# Patient Record
Sex: FEMALE | Race: OTHER | Employment: FULL TIME | URBAN - METROPOLITAN AREA
[De-identification: names, ages, dates, MRNs, and addresses within clinical notes are randomized per-mention and may not be internally consistent; named-entity substitution may affect disease eponyms.]

---

## 2023-06-05 ENCOUNTER — OFFICE VISIT (OUTPATIENT)
Dept: PRIMARY CARE CLINIC | Age: 38
End: 2023-06-05
Payer: COMMERCIAL

## 2023-06-05 VITALS
HEART RATE: 79 BPM | TEMPERATURE: 98.4 F | DIASTOLIC BLOOD PRESSURE: 78 MMHG | WEIGHT: 260 LBS | OXYGEN SATURATION: 99 % | SYSTOLIC BLOOD PRESSURE: 124 MMHG

## 2023-06-05 DIAGNOSIS — Z11.4 SCREENING FOR HIV WITHOUT PRESENCE OF RISK FACTORS: ICD-10-CM

## 2023-06-05 DIAGNOSIS — L30.9 ECZEMA, UNSPECIFIED TYPE: ICD-10-CM

## 2023-06-05 DIAGNOSIS — Z00.00 ROUTINE ADULT HEALTH MAINTENANCE: ICD-10-CM

## 2023-06-05 DIAGNOSIS — Z00.00 ROUTINE ADULT HEALTH MAINTENANCE: Primary | ICD-10-CM

## 2023-06-05 DIAGNOSIS — J30.2 SEASONAL ALLERGIES: ICD-10-CM

## 2023-06-05 DIAGNOSIS — Z11.59 NEED FOR HEPATITIS C SCREENING TEST: ICD-10-CM

## 2023-06-05 LAB
ALBUMIN SERPL-MCNC: 4 G/DL (ref 3.5–4.6)
ALP SERPL-CCNC: 55 U/L (ref 40–130)
ALT SERPL-CCNC: 24 U/L (ref 0–33)
ANION GAP SERPL CALCULATED.3IONS-SCNC: 11 MEQ/L (ref 9–15)
AST SERPL-CCNC: 17 U/L (ref 0–35)
BASOPHILS # BLD: 0 K/UL (ref 0–0.2)
BASOPHILS NFR BLD: 0.4 %
BILIRUB SERPL-MCNC: 0.4 MG/DL (ref 0.2–0.7)
BUN SERPL-MCNC: 11 MG/DL (ref 6–20)
CALCIUM SERPL-MCNC: 9 MG/DL (ref 8.5–9.9)
CHLORIDE SERPL-SCNC: 104 MEQ/L (ref 95–107)
CHOLEST SERPL-MCNC: 116 MG/DL (ref 0–199)
CO2 SERPL-SCNC: 25 MEQ/L (ref 20–31)
CREAT SERPL-MCNC: 0.61 MG/DL (ref 0.5–0.9)
EOSINOPHIL # BLD: 0.1 K/UL (ref 0–0.7)
EOSINOPHIL NFR BLD: 1.5 %
ERYTHROCYTE [DISTWIDTH] IN BLOOD BY AUTOMATED COUNT: 13.6 % (ref 11.5–14.5)
GLOBULIN SER CALC-MCNC: 4.2 G/DL (ref 2.3–3.5)
GLUCOSE SERPL-MCNC: 89 MG/DL (ref 70–99)
HBA1C MFR BLD: 5 % (ref 4.8–5.9)
HCT VFR BLD AUTO: 42.2 % (ref 37–47)
HDLC SERPL-MCNC: 46 MG/DL (ref 40–59)
HEPATITIS C ANTIBODY: NONREACTIVE
HGB BLD-MCNC: 14.2 G/DL (ref 12–16)
HIV AG/AB: NONREACTIVE
LDL CHOLESTEROL CALCULATED: 61 MG/DL (ref 0–129)
LYMPHOCYTES # BLD: 1.9 K/UL (ref 1–4.8)
LYMPHOCYTES NFR BLD: 19.4 %
MCH RBC QN AUTO: 31.4 PG (ref 27–31.3)
MCHC RBC AUTO-ENTMCNC: 33.7 % (ref 33–37)
MCV RBC AUTO: 93.2 FL (ref 79.4–94.8)
MONOCYTES # BLD: 0.7 K/UL (ref 0.2–0.8)
MONOCYTES NFR BLD: 7.8 %
NEUTROPHILS # BLD: 6.8 K/UL (ref 1.4–6.5)
NEUTS SEG NFR BLD: 70.9 %
PLATELET # BLD AUTO: 298 K/UL (ref 130–400)
POTASSIUM SERPL-SCNC: 4.1 MEQ/L (ref 3.4–4.9)
PROT SERPL-MCNC: 8.2 G/DL (ref 6.3–8)
RBC # BLD AUTO: 4.53 M/UL (ref 4.2–5.4)
SODIUM SERPL-SCNC: 140 MEQ/L (ref 135–144)
TRIGLYCERIDE, FASTING: 43 MG/DL (ref 0–150)
TSH REFLEX: 1.15 UIU/ML (ref 0.44–3.86)
VITAMIN D 25-HYDROXY: 28.5 NG/ML
WBC # BLD AUTO: 9.6 K/UL (ref 4.8–10.8)

## 2023-06-05 PROCEDURE — 99385 PREV VISIT NEW AGE 18-39: CPT | Performed by: STUDENT IN AN ORGANIZED HEALTH CARE EDUCATION/TRAINING PROGRAM

## 2023-06-05 RX ORDER — BENZONATATE 100 MG/1
100 CAPSULE ORAL 3 TIMES DAILY PRN
COMMUNITY
Start: 2021-09-20 | End: 2023-06-05 | Stop reason: ALTCHOICE

## 2023-06-05 RX ORDER — ALBUTEROL SULFATE 90 UG/1
2 AEROSOL, METERED RESPIRATORY (INHALATION) EVERY 4 HOURS PRN
COMMUNITY
Start: 2021-06-28 | End: 2023-06-05 | Stop reason: ALTCHOICE

## 2023-06-05 RX ORDER — MONTELUKAST SODIUM 10 MG/1
TABLET ORAL
COMMUNITY
Start: 2021-06-28 | End: 2023-06-05 | Stop reason: SDUPTHER

## 2023-06-05 RX ORDER — BUPROPION HYDROCHLORIDE 150 MG/1
150 TABLET, EXTENDED RELEASE ORAL 2 TIMES DAILY
COMMUNITY
Start: 2021-09-13 | End: 2023-06-05 | Stop reason: ALTCHOICE

## 2023-06-05 RX ORDER — ESCITALOPRAM OXALATE 10 MG/1
10 TABLET ORAL DAILY
COMMUNITY
End: 2023-06-05 | Stop reason: ALTCHOICE

## 2023-06-05 RX ORDER — CETIRIZINE HYDROCHLORIDE 10 MG/1
10 TABLET ORAL NIGHTLY
COMMUNITY
Start: 2021-06-28

## 2023-06-05 RX ORDER — FLUTICASONE PROPIONATE 44 UG/1
2 AEROSOL, METERED RESPIRATORY (INHALATION) 2 TIMES DAILY
COMMUNITY
Start: 2021-09-27 | End: 2023-06-05 | Stop reason: ALTCHOICE

## 2023-06-05 RX ORDER — METHYLPREDNISOLONE 4 MG/1
TABLET ORAL
COMMUNITY
Start: 2021-09-20 | End: 2023-06-05 | Stop reason: ALTCHOICE

## 2023-06-05 RX ORDER — MONTELUKAST SODIUM 10 MG/1
TABLET ORAL
Qty: 30 TABLET | Refills: 5 | Status: SHIPPED | OUTPATIENT
Start: 2023-06-05

## 2023-06-05 RX ORDER — UREA 10 %
1 LOTION (ML) TOPICAL DAILY
COMMUNITY
Start: 2020-08-21 | End: 2023-06-05 | Stop reason: ALTCHOICE

## 2023-06-05 RX ORDER — NORETHINDRONE ACETATE AND ETHINYL ESTRADIOL 1; 5 MG/1; UG/1
1 TABLET ORAL DAILY
COMMUNITY

## 2023-06-05 RX ORDER — GUAIFENESIN 600 MG/1
600 TABLET, EXTENDED RELEASE ORAL EVERY 12 HOURS PRN
COMMUNITY
Start: 2021-09-24 | End: 2023-06-05 | Stop reason: ALTCHOICE

## 2023-06-05 SDOH — ECONOMIC STABILITY: INCOME INSECURITY: HOW HARD IS IT FOR YOU TO PAY FOR THE VERY BASICS LIKE FOOD, HOUSING, MEDICAL CARE, AND HEATING?: NOT VERY HARD

## 2023-06-05 SDOH — ECONOMIC STABILITY: HOUSING INSECURITY
IN THE LAST 12 MONTHS, WAS THERE A TIME WHEN YOU DID NOT HAVE A STEADY PLACE TO SLEEP OR SLEPT IN A SHELTER (INCLUDING NOW)?: NO

## 2023-06-05 SDOH — ECONOMIC STABILITY: FOOD INSECURITY: WITHIN THE PAST 12 MONTHS, THE FOOD YOU BOUGHT JUST DIDN'T LAST AND YOU DIDN'T HAVE MONEY TO GET MORE.: NEVER TRUE

## 2023-06-05 SDOH — ECONOMIC STABILITY: FOOD INSECURITY: WITHIN THE PAST 12 MONTHS, YOU WORRIED THAT YOUR FOOD WOULD RUN OUT BEFORE YOU GOT MONEY TO BUY MORE.: NEVER TRUE

## 2023-06-05 ASSESSMENT — PATIENT HEALTH QUESTIONNAIRE - PHQ9
SUM OF ALL RESPONSES TO PHQ QUESTIONS 1-9: 0
2. FEELING DOWN, DEPRESSED OR HOPELESS: 0
SUM OF ALL RESPONSES TO PHQ QUESTIONS 1-9: 0
1. LITTLE INTEREST OR PLEASURE IN DOING THINGS: 0
SUM OF ALL RESPONSES TO PHQ9 QUESTIONS 1 & 2: 0

## 2023-06-05 NOTE — PATIENT INSTRUCTIONS
Have labwork done, fast for 10 hours beforehand, you may have black coffee or water only. Our clinic will contact you when results are made available. OB/GYN referral placed, they will call you to set this. Dermatology referral placed, they will call you to schedule this. Refilled Singulair  Continue Zyrtec  Continue seeing therapy and let me know if you would like to restart Wellbutrin.

## 2023-06-05 NOTE — PROGRESS NOTES
MG tablet Therapy completed    benzonatate (TESSALON) 100 MG capsule Therapy completed    methylPREDNISolone (MEDROL DOSEPACK) 4 MG tablet Therapy completed    albuterol sulfate HFA (PROVENTIL;VENTOLIN;PROAIR) 108 (90 Base) MCG/ACT inhaler Therapy completed    fluticasone (FLOVENT HFA) 44 MCG/ACT inhaler Therapy completed    guaiFENesin (MUCINEX) 600 MG extended release tablet Therapy completed    Lactobacillus TABS Therapy completed    buPROPion (WELLBUTRIN SR) 150 MG extended release tablet Therapy completed    montelukast (SINGULAIR) 10 MG tablet REORDER     Return in about 6 months (around 12/5/2023). Advised to use eczema lotion until she can get into the dermatologist.  Referral to OB/GYN for Pap smear, birth control refill, PCOS testing. Routine lab work. She will continue to see therapist and let me know if she wants to restart Wellbutrin. Objective  Allergies   Allergen Reactions    Zolmitriptan Shortness Of Breath     Current Outpatient Medications   Medication Sig Dispense Refill    norethindrone-ethinyl estradiol (JINTELI, FYAVOLV) 1-5 MG-MCG TABS per tablet Take 1 tablet by mouth daily      montelukast (SINGULAIR) 10 MG tablet take 1 tablet by mouth nightly at bedtime. Can add to cetirizine if needed 30 tablet 5    cetirizine (ZYRTEC) 10 MG tablet Take 1 tablet by mouth nightly (Patient not taking: Reported on 6/5/2023)       No current facility-administered medications for this visit. PMH, Surgical Hx, Family Hx, and Social Hx reviewed and updated. Health Maintenance reviewed.     Vitals:    06/05/23 0917   BP: 124/78   Site: Right Upper Arm   Position: Sitting   Cuff Size: Large Adult   Pulse: 79   Temp: 98.4 °F (36.9 °C)   TempSrc: Temporal   SpO2: 99%   Weight: 260 lb (117.9 kg)     BP Readings from Last 3 Encounters:   06/05/23 124/78     Wt Readings from Last 3 Encounters:   06/05/23 260 lb (117.9 kg)       No results found for: LABA1C  No results found for: LABMICR, CREATININE  No

## 2023-06-15 DIAGNOSIS — N92.6 IRREGULAR PERIODS/MENSTRUAL CYCLES: ICD-10-CM

## 2023-06-15 DIAGNOSIS — L68.0 HIRSUTISM: ICD-10-CM

## 2023-06-15 DIAGNOSIS — Z11.51 SCREENING FOR HUMAN PAPILLOMAVIRUS: ICD-10-CM

## 2023-06-15 DIAGNOSIS — Z01.419 ENCOUNTER FOR WELL WOMAN EXAM WITH ROUTINE GYNECOLOGICAL EXAM: ICD-10-CM

## 2023-06-15 DIAGNOSIS — L70.8 OTHER ACNE: ICD-10-CM

## 2023-06-15 LAB — PROLACTIN SERPL-MCNC: 27.6 NG/ML

## 2023-06-16 LAB
DHEA-S SERPL-MCNC: 188 UG/DL (ref 45–270)
FOLLICLE STIMULATING HORMONE: 9.4 MIU/ML (ref 1.7–21.5)
LH: 10.9 MIU/ML (ref 1–95.6)
SHBG SERPL-SCNC: 40 NMOL/L (ref 30–135)
TESTOST FREE SERPL-MCNC: 7.4 PG/ML (ref 1.3–9.2)
TESTOST SERPL-MCNC: 46 NG/DL (ref 20–70)

## 2023-06-18 LAB
INSULIN FREE SERPL-ACNC: 12 UIU/ML (ref 3–25)
INSULIN SERPL-ACNC: 15 UIU/ML (ref 3–25)

## 2023-06-19 LAB — MIS SERPL-MCNC: 0.16 NG/ML (ref 0.18–11.71)

## 2023-06-21 ENCOUNTER — HOSPITAL ENCOUNTER (OUTPATIENT)
Dept: ULTRASOUND IMAGING | Age: 38
Discharge: HOME | End: 2023-06-23
Payer: COMMERCIAL

## 2023-06-21 DIAGNOSIS — N92.6 IRREGULAR PERIODS/MENSTRUAL CYCLES: ICD-10-CM

## 2023-06-21 LAB — 17OHP SERPL-MCNC: 13.98 NG/DL

## 2023-06-21 PROCEDURE — 76856 US EXAM PELVIC COMPLETE: CPT

## 2023-06-21 PROCEDURE — 93975 VASCULAR STUDY: CPT

## 2023-06-21 PROCEDURE — 76830 TRANSVAGINAL US NON-OB: CPT

## 2023-06-24 LAB
HPV HR 12 DNA SPEC QL NAA+PROBE: NOT DETECTED
HPV16 DNA SPEC QL NAA+PROBE: NOT DETECTED
HPV16+18+H RISK 12 DNA SPEC-IMP: NORMAL
HPV18 DNA SPEC QL NAA+PROBE: NOT DETECTED

## 2023-07-05 ENCOUNTER — OFFICE VISIT (OUTPATIENT)
Dept: OBGYN CLINIC | Age: 38
End: 2023-07-05
Payer: COMMERCIAL

## 2023-07-05 VITALS
DIASTOLIC BLOOD PRESSURE: 72 MMHG | HEIGHT: 64 IN | SYSTOLIC BLOOD PRESSURE: 126 MMHG | WEIGHT: 258 LBS | BODY MASS INDEX: 44.05 KG/M2

## 2023-07-05 DIAGNOSIS — N92.6 IRREGULAR PERIODS/MENSTRUAL CYCLES: Primary | ICD-10-CM

## 2023-07-05 PROCEDURE — 99213 OFFICE O/P EST LOW 20 MIN: CPT | Performed by: OBSTETRICS & GYNECOLOGY

## 2023-07-05 RX ORDER — ACETAMINOPHEN AND CODEINE PHOSPHATE 120; 12 MG/5ML; MG/5ML
1 SOLUTION ORAL DAILY
Qty: 3 TABLET | Refills: 3 | Status: SHIPPED | OUTPATIENT
Start: 2023-07-05

## 2023-07-05 ASSESSMENT — ENCOUNTER SYMPTOMS
ABDOMINAL DISTENTION: 0
RESPIRATORY NEGATIVE: 1
VOMITING: 0
ALLERGIC/IMMUNOLOGIC NEGATIVE: 1
DIARRHEA: 0
NAUSEA: 0
BLOOD IN STOOL: 0
ABDOMINAL PAIN: 0
CONSTIPATION: 0
EYES NEGATIVE: 1
ANAL BLEEDING: 0
RECTAL PAIN: 0

## 2023-07-21 ENCOUNTER — OFFICE VISIT (OUTPATIENT)
Dept: OBGYN CLINIC | Age: 38
End: 2023-07-21
Payer: COMMERCIAL

## 2023-07-21 VITALS
HEART RATE: 89 BPM | SYSTOLIC BLOOD PRESSURE: 118 MMHG | DIASTOLIC BLOOD PRESSURE: 74 MMHG | WEIGHT: 259 LBS | BODY MASS INDEX: 44.46 KG/M2

## 2023-07-21 DIAGNOSIS — N76.4 VULVAR ABSCESS: Primary | ICD-10-CM

## 2023-07-21 PROCEDURE — 99212 OFFICE O/P EST SF 10 MIN: CPT | Performed by: ADVANCED PRACTICE MIDWIFE

## 2023-07-21 RX ORDER — CLOBETASOL PROPIONATE 0.5 MG/G
OINTMENT TOPICAL
COMMUNITY
Start: 2023-07-14

## 2023-07-21 RX ORDER — MOMETASONE FUROATE 1 MG/G
CREAM TOPICAL
COMMUNITY
Start: 2023-07-14

## 2023-07-21 RX ORDER — BENZOYL PEROXIDE 50 MG/ML
LIQUID TOPICAL
COMMUNITY
Start: 2023-07-15

## 2023-07-21 RX ORDER — CLINDAMYCIN PHOSPHATE 10 UG/ML
LOTION TOPICAL
COMMUNITY
Start: 2023-07-14

## 2023-07-21 RX ORDER — KETOCONAZOLE 20 MG/G
CREAM TOPICAL
COMMUNITY
Start: 2023-07-14

## 2023-07-21 RX ORDER — AMMONIUM LACTATE 12 G/100G
CREAM TOPICAL
COMMUNITY
Start: 2023-07-14

## 2023-07-21 ASSESSMENT — ENCOUNTER SYMPTOMS
VOMITING: 0
CONSTIPATION: 0
SHORTNESS OF BREATH: 0
DIARRHEA: 0
NAUSEA: 0
COUGH: 0
ABDOMINAL PAIN: 0

## 2023-09-01 ENCOUNTER — OFFICE VISIT (OUTPATIENT)
Dept: PRIMARY CARE CLINIC | Age: 38
End: 2023-09-01
Payer: COMMERCIAL

## 2023-09-01 VITALS
TEMPERATURE: 97.5 F | HEIGHT: 64 IN | WEIGHT: 261 LBS | SYSTOLIC BLOOD PRESSURE: 128 MMHG | HEART RATE: 90 BPM | BODY MASS INDEX: 44.56 KG/M2 | DIASTOLIC BLOOD PRESSURE: 86 MMHG | OXYGEN SATURATION: 98 %

## 2023-09-01 DIAGNOSIS — M54.16 LUMBAR BACK PAIN WITH RADICULOPATHY AFFECTING RIGHT LOWER EXTREMITY: Primary | ICD-10-CM

## 2023-09-01 PROCEDURE — 99213 OFFICE O/P EST LOW 20 MIN: CPT | Performed by: STUDENT IN AN ORGANIZED HEALTH CARE EDUCATION/TRAINING PROGRAM

## 2023-09-01 RX ORDER — METHYLPREDNISOLONE 4 MG/1
TABLET ORAL
Qty: 1 KIT | Refills: 0 | Status: SHIPPED | OUTPATIENT
Start: 2023-09-01

## 2023-09-01 NOTE — PATIENT INSTRUCTIONS
Steroid pack  Low back x-ray  if no improvement in 5 days, you can come in for a Toradol shot.   Advised to take vitamin C 1000 mg daily for 5 days to boost immune system

## 2023-09-01 NOTE — PROGRESS NOTES
9/1/2023        Kylie Borden 1985 is a 41 y.o. female who presents today with:  Chief Complaint   Patient presents with    Lower Back Pain     Patient presents today with complaint of lower back pain x one day, patient states the pain is also on her back radiating down her right leg        HPI:   Pepper Vallecillo presents today due to right lower back pain radiating to her right leg for 1 day. She states the pain feels like pressure in her lower back and right hip. She denies any significant history of back injuries. She states sleep most pain, through the day does not remember spraining herself too much or injuring herself. She denies difficulty with bowel movements or urinary problems. She states that all movements make the pain worse. Assessment & Plan   1. Lumbar back pain with radiculopathy affecting right lower extremity  -     methylPREDNISolone (MEDROL DOSEPACK) 4 MG tablet; Take by mouth as directed on package once daily, Disp-1 kit, R-0Normal  -     XR LUMBAR SPINE (2-3 VIEWS); Future     There are no discontinued medications. No follow-ups on file. Steroid pack, if no improvement in 5 days, advised she can come in for a Toradol shot. Advised take vitamin C 1000 mg daily for 5 days to boost immune system    Objective  Allergies   Allergen Reactions    Zolmitriptan Shortness Of Breath     Current Outpatient Medications   Medication Sig Dispense Refill    ammonium lactate (AMLACTIN) 12 % cream APPLY A THIN LAYER TO FEET TWICE DAILY      BENZOYL PEROXIDE 5 % external wash WASH BACK ONCE DAILY IN THE SHOWER.       clindamycin (CLEOCIN T) 1 % lotion apply a thin layer to back once a day after shower and drying off.      clobetasol (TEMOVATE) 0.05 % ointment apply a thin layer to feet twice daily for 3 weeks one week off then repeat until clear.      ketoconazole (NIZORAL) 2 % cream MIX A PEA SIZED AMOUNT WITH A PEA SIZED AMOUNT OF MOMETASONE CREAM. APPLY A THIN AMOUNT TO AFFECTED AREAS ON FACE TWICE

## 2023-09-13 DIAGNOSIS — M54.16 LUMBAR BACK PAIN WITH RADICULOPATHY AFFECTING RIGHT LOWER EXTREMITY: Primary | ICD-10-CM

## 2023-09-27 ENCOUNTER — OFFICE VISIT (OUTPATIENT)
Dept: PRIMARY CARE CLINIC | Age: 38
End: 2023-09-27
Payer: COMMERCIAL

## 2023-09-27 VITALS
SYSTOLIC BLOOD PRESSURE: 114 MMHG | OXYGEN SATURATION: 99 % | WEIGHT: 263.8 LBS | BODY MASS INDEX: 45.04 KG/M2 | DIASTOLIC BLOOD PRESSURE: 76 MMHG | HEIGHT: 64 IN | HEART RATE: 79 BPM

## 2023-09-27 DIAGNOSIS — M54.16 LUMBAR RADICULOPATHY: ICD-10-CM

## 2023-09-27 DIAGNOSIS — E66.01 CLASS 3 SEVERE OBESITY WITH BODY MASS INDEX (BMI) OF 45.0 TO 49.9 IN ADULT, UNSPECIFIED OBESITY TYPE, UNSPECIFIED WHETHER SERIOUS COMORBIDITY PRESENT (HCC): Primary | ICD-10-CM

## 2023-09-27 PROCEDURE — 99213 OFFICE O/P EST LOW 20 MIN: CPT | Performed by: STUDENT IN AN ORGANIZED HEALTH CARE EDUCATION/TRAINING PROGRAM

## 2023-09-27 NOTE — PROGRESS NOTES
montelukast (SINGULAIR) 10 MG tablet take 1 tablet by mouth nightly at bedtime. Can add to cetirizine if needed 30 tablet 5     No current facility-administered medications for this visit. PMH, Surgical Hx, Family Hx, and Social Hx reviewed and updated. Health Maintenance reviewed. Vitals:    09/27/23 1401   BP: 114/76   Site: Left Upper Arm   Position: Sitting   Cuff Size: Large Adult   Pulse: 79   SpO2: 99%   Weight: 263 lb 12.8 oz (119.7 kg)   Height: 5' 4\" (1.626 m)     BP Readings from Last 3 Encounters:   09/27/23 114/76   09/21/23 130/78   09/01/23 128/86     Wt Readings from Last 3 Encounters:   09/27/23 263 lb 12.8 oz (119.7 kg)   09/21/23 260 lb (117.9 kg)   09/01/23 261 lb (118.4 kg)       Lab Results   Component Value Date    LABA1C 5.0 06/05/2023     Lab Results   Component Value Date    CREATININE 0.61 06/05/2023     Lab Results   Component Value Date    ALT 24 06/05/2023    AST 17 06/05/2023     Lab Results   Component Value Date    HDL 46 06/05/2023    LDLCALC 61 06/05/2023        Review of Systems   Physical Exam  Constitutional:       General: She is not in acute distress. Appearance: Normal appearance. She is obese. She is not toxic-appearing. HENT:      Right Ear: External ear normal.      Left Ear: External ear normal.      Mouth/Throat:      Mouth: Mucous membranes are moist.   Eyes:      Extraocular Movements: Extraocular movements intact. Pupils: Pupils are equal, round, and reactive to light. Neck:      Vascular: No carotid bruit. Cardiovascular:      Rate and Rhythm: Normal rate and regular rhythm. Pulses: Normal pulses. Heart sounds: Normal heart sounds. Pulmonary:      Effort: No respiratory distress. Breath sounds: No wheezing. Abdominal:      General: Bowel sounds are normal.      Palpations: Abdomen is soft. Tenderness: There is no abdominal tenderness. Musculoskeletal:         General: Normal range of motion.    Skin:     General:

## 2023-10-12 ENCOUNTER — OFFICE VISIT (OUTPATIENT)
Dept: FAMILY MEDICINE CLINIC | Age: 38
End: 2023-10-12
Payer: COMMERCIAL

## 2023-10-12 VITALS
OXYGEN SATURATION: 99 % | BODY MASS INDEX: 45.58 KG/M2 | WEIGHT: 267 LBS | SYSTOLIC BLOOD PRESSURE: 118 MMHG | HEIGHT: 64 IN | HEART RATE: 76 BPM | DIASTOLIC BLOOD PRESSURE: 76 MMHG

## 2023-10-12 DIAGNOSIS — N92.6 IRREGULAR MENSTRUAL CYCLE: Primary | ICD-10-CM

## 2023-10-12 DIAGNOSIS — R63.5 ABNORMAL WEIGHT GAIN: ICD-10-CM

## 2023-10-12 PROCEDURE — 99215 OFFICE O/P EST HI 40 MIN: CPT | Performed by: STUDENT IN AN ORGANIZED HEALTH CARE EDUCATION/TRAINING PROGRAM

## 2023-10-12 RX ORDER — PHENTERMINE HYDROCHLORIDE 37.5 MG/1
37.5 TABLET ORAL
Qty: 30 TABLET | Refills: 0 | Status: SHIPPED | OUTPATIENT
Start: 2023-10-12 | End: 2023-11-11

## 2023-10-12 SDOH — HEALTH STABILITY: PHYSICAL HEALTH: ON AVERAGE, HOW MANY MINUTES DO YOU ENGAGE IN EXERCISE AT THIS LEVEL?: 30 MIN

## 2023-10-12 SDOH — HEALTH STABILITY: PHYSICAL HEALTH: ON AVERAGE, HOW MANY DAYS PER WEEK DO YOU ENGAGE IN MODERATE TO STRENUOUS EXERCISE (LIKE A BRISK WALK)?: 1 DAY

## 2023-10-12 ASSESSMENT — ENCOUNTER SYMPTOMS
SHORTNESS OF BREATH: 0
SINUS PRESSURE: 0
SORE THROAT: 0
COUGH: 0
ABDOMINAL PAIN: 0
VOMITING: 0

## 2023-10-12 NOTE — PROGRESS NOTES
Weight management 30 min, nutrition. Reviewed with the patient: current clinical status, medications, activities and diet. Close follow up to evaluate treatment results and for coordination of care. Time was given for questions. All questions were answered to the patients satisfaction.    12 minutes spent on reviewing previous notes, records and labs; 35 minutes spent on physical exam, obesity, medication and diet education; 15 minutes spent on finalizing chart note

## 2023-11-18 ENCOUNTER — OFFICE VISIT (OUTPATIENT)
Dept: FAMILY MEDICINE CLINIC | Age: 38
End: 2023-11-18

## 2023-11-18 VITALS
HEART RATE: 79 BPM | WEIGHT: 256 LBS | BODY MASS INDEX: 43.94 KG/M2 | SYSTOLIC BLOOD PRESSURE: 130 MMHG | DIASTOLIC BLOOD PRESSURE: 80 MMHG | OXYGEN SATURATION: 97 % | TEMPERATURE: 97.7 F

## 2023-11-18 DIAGNOSIS — B49 FUNGAL INFECTION: ICD-10-CM

## 2023-11-18 DIAGNOSIS — B36.9 FUNGAL INFECTION OF SKIN: Primary | ICD-10-CM

## 2023-11-18 ASSESSMENT — ENCOUNTER SYMPTOMS
ABDOMINAL PAIN: 0
PHOTOPHOBIA: 0
SHORTNESS OF BREATH: 0
SORE THROAT: 0
COUGH: 0
VOMITING: 0
DIARRHEA: 0
EYE PAIN: 0
WHEEZING: 0
BACK PAIN: 0
NAUSEA: 0
EYE REDNESS: 0

## 2024-01-15 ENCOUNTER — OFFICE VISIT (OUTPATIENT)
Dept: FAMILY MEDICINE CLINIC | Age: 39
End: 2024-01-15
Payer: COMMERCIAL

## 2024-01-15 VITALS
SYSTOLIC BLOOD PRESSURE: 118 MMHG | WEIGHT: 259 LBS | OXYGEN SATURATION: 100 % | BODY MASS INDEX: 44.44 KG/M2 | DIASTOLIC BLOOD PRESSURE: 88 MMHG | HEART RATE: 76 BPM

## 2024-01-15 DIAGNOSIS — R63.5 ABNORMAL WEIGHT GAIN: ICD-10-CM

## 2024-01-15 PROCEDURE — 99214 OFFICE O/P EST MOD 30 MIN: CPT | Performed by: STUDENT IN AN ORGANIZED HEALTH CARE EDUCATION/TRAINING PROGRAM

## 2024-01-15 RX ORDER — PHENTERMINE HYDROCHLORIDE 37.5 MG/1
37.5 TABLET ORAL
Qty: 30 TABLET | Refills: 0 | Status: SHIPPED | OUTPATIENT
Start: 2024-01-15 | End: 2024-02-14

## 2024-01-15 ASSESSMENT — PATIENT HEALTH QUESTIONNAIRE - PHQ9
SUM OF ALL RESPONSES TO PHQ QUESTIONS 1-9: 2
1. LITTLE INTEREST OR PLEASURE IN DOING THINGS: 1
SUM OF ALL RESPONSES TO PHQ QUESTIONS 1-9: 2
SUM OF ALL RESPONSES TO PHQ9 QUESTIONS 1 & 2: 2
SUM OF ALL RESPONSES TO PHQ QUESTIONS 1-9: 2
1. LITTLE INTEREST OR PLEASURE IN DOING THINGS: SEVERAL DAYS
2. FEELING DOWN, DEPRESSED OR HOPELESS: SEVERAL DAYS
SUM OF ALL RESPONSES TO PHQ QUESTIONS 1-9: 2
2. FEELING DOWN, DEPRESSED OR HOPELESS: 1
SUM OF ALL RESPONSES TO PHQ9 QUESTIONS 1 & 2: 2

## 2024-01-15 ASSESSMENT — ENCOUNTER SYMPTOMS
SHORTNESS OF BREATH: 0
SORE THROAT: 0
VOMITING: 0
ABDOMINAL PAIN: 0
SINUS PRESSURE: 0
COUGH: 0

## 2024-01-15 NOTE — PROGRESS NOTES
Weight management  Kylie Lema  YOB: 1985 Age: 38 y.o.  Sex: female  Date of Assessment:  1/15/2024  PCP: Purnima Valdez MD  Referred by: No ref. provider found     Chief Complaint   Patient presents with    Weight Management     Starting waist circumference: 45.5 inches     HPI  Patient is here today with interest in weight loss.     Weight History  How does your weight affect your life and health?  I injured my back recently and it partly caused weight gain, I find it difficult to do tasks, I am not happy with my weight    When did you first notice that you were gaining weight?   [x]  Childhood  []  Teens []  Adulthood    []  Pregnancy  []  Menopause      How much did you weigh:   1 year ago?  250 lbs  5 years ago?  260 lbs  10 years ago?  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  20 pounds  What are your greatest challenges with dieting?  Forming good/healthy habits, stress eating, I cannot stand most diabetes    Medications  Have you ever taken medication to lose weight? (check all that apply):       [] Phentermine (Adipex) []  Topamax   [] Qsymia    []  Bupropion (Wellbutrin)      []  Contrave    [] Xenical/Ali  [] Saxenda   []

## 2024-02-20 ENCOUNTER — OFFICE VISIT (OUTPATIENT)
Dept: PRIMARY CARE CLINIC | Age: 39
End: 2024-02-20
Payer: COMMERCIAL

## 2024-02-20 VITALS
DIASTOLIC BLOOD PRESSURE: 78 MMHG | WEIGHT: 257 LBS | HEIGHT: 64 IN | HEART RATE: 77 BPM | BODY MASS INDEX: 43.87 KG/M2 | SYSTOLIC BLOOD PRESSURE: 122 MMHG | OXYGEN SATURATION: 98 %

## 2024-02-20 DIAGNOSIS — S09.90XA CLOSED HEAD INJURY, INITIAL ENCOUNTER: ICD-10-CM
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
## 2024-02-20 PROCEDURE — 99214 OFFICE O/P EST MOD 30 MIN: CPT | Performed by: STUDENT IN AN ORGANIZED HEALTH CARE EDUCATION/TRAINING PROGRAM

## 2024-02-20 RX ORDER — PHENTERMINE HYDROCHLORIDE 37.5 MG/1
37.5 TABLET ORAL
Qty: 30 TABLET | Refills: 0 | Status: CANCELLED | OUTPATIENT
Start: 2024-02-20 | End: 2024-03-21

## 2024-02-20 RX ORDER — CYCLOBENZAPRINE HCL 10 MG
10 TABLET ORAL 3 TIMES DAILY PRN
Qty: 90 TABLET | Refills: 0 | Status: SHIPPED | OUTPATIENT
Start: 2024-02-20 | End: 2024-03-21

## 2024-02-20 RX ORDER — IBUPROFEN 600 MG/1
600 TABLET ORAL 3 TIMES DAILY PRN
Qty: 90 TABLET | Refills: 0 | Status: SHIPPED | OUTPATIENT
Start: 2024-02-20

## 2024-02-20 RX ORDER — KETOCONAZOLE 20 MG/ML
SHAMPOO TOPICAL
COMMUNITY
Start: 2024-02-07

## 2024-02-20 NOTE — PATIENT INSTRUCTIONS
Sent ibuprofen 600 mg 3 times daily, take this for at least 7 to 10 days to lower inflammation then you can take it as needed.  Sent cyclobenzaprine 10 mg up to 3 times daily, this is a muscle relaxer, it can cause drowsiness.  Look out for symptoms such as nausea, dizziness (room spinning), headache, blurry vision and if so, please let me know and I can order a CT scan of the head.

## 2024-02-26 DIAGNOSIS — S09.90XA CLOSED HEAD INJURY, INITIAL ENCOUNTER: Primary | ICD-10-CM

## 2024-03-19 ENCOUNTER — OFFICE VISIT (OUTPATIENT)
Dept: PRIMARY CARE CLINIC | Age: 39
End: 2024-03-19
Payer: COMMERCIAL

## 2024-03-19 VITALS
BODY MASS INDEX: 43.71 KG/M2 | TEMPERATURE: 97.3 F | DIASTOLIC BLOOD PRESSURE: 80 MMHG | SYSTOLIC BLOOD PRESSURE: 122 MMHG | WEIGHT: 256 LBS | HEIGHT: 64 IN | OXYGEN SATURATION: 99 % | HEART RATE: 88 BPM

## 2024-03-19 DIAGNOSIS — S86.811A STRAIN OF CALF MUSCLE, RIGHT, INITIAL ENCOUNTER: Primary | ICD-10-CM

## 2024-03-19 PROCEDURE — 99213 OFFICE O/P EST LOW 20 MIN: CPT | Performed by: STUDENT IN AN ORGANIZED HEALTH CARE EDUCATION/TRAINING PROGRAM

## 2024-03-19 NOTE — PROGRESS NOTES
3/19/2024        Kylie Lema 1985 is a 38 y.o. female who presents today with:  Chief Complaint   Patient presents with    Muscle Pain     Bilateral Achilles tendon muscle strain, patient not sure if it is related to exercise, morning stiffness  exercises horse stance, calf raises, skipping stairs to build lower body strength, tx: stretches,  tylenol x1 week and 1 day        HPI: Patient presents due to: She has been working out and believes she strained her right calf.  She states that she has been favoring the right calf and now her left is also hurting.  She states it feels like stiffness.  She has been taking Tylenol for 1 week    Assessment & Plan   1. Strain of calf muscle, right, initial encounter     There are no discontinued medications.  No follow-ups on file.    Advised to use ibuprofen as needed for any pain, take her cyclobenzaprine, rest, and do calf stretches    Objective  Allergies   Allergen Reactions    Zolmitriptan Shortness Of Breath     Current Outpatient Medications   Medication Sig Dispense Refill    ketoconazole (NIZORAL) 2 % shampoo       cyclobenzaprine (FLEXERIL) 10 MG tablet Take 1 tablet by mouth 3 times daily as needed for Muscle spasms 90 tablet 0    ibuprofen (ADVIL;MOTRIN) 600 MG tablet Take 1 tablet by mouth 3 times daily as needed for Pain 90 tablet 0    nystatin (MYCOSTATIN) 772471 UNIT/GM cream Apply topically 2 times daily. 30 g 2    nystatin-triamcinolone (MYCOLOG II) 149468-8.1 UNIT/GM-% cream Apply topically 2 times daily. 30 g 0    ammonium lactate (AMLACTIN) 12 % cream APPLY A THIN LAYER TO FEET TWICE DAILY      BENZOYL PEROXIDE 5 % external wash WASH BACK ONCE DAILY IN THE SHOWER.      clindamycin (CLEOCIN T) 1 % lotion apply a thin layer to back once a day after shower and drying off.      clobetasol (TEMOVATE) 0.05 % ointment apply a thin layer to feet twice daily for 3 weeks one week off then repeat until clear.      ketoconazole (NIZORAL) 2 % cream MIX A PEA

## 2024-04-03 ENCOUNTER — OFFICE VISIT (OUTPATIENT)
Dept: PRIMARY CARE CLINIC | Age: 39
End: 2024-04-03
Payer: COMMERCIAL

## 2024-04-03 VITALS
DIASTOLIC BLOOD PRESSURE: 72 MMHG | HEART RATE: 77 BPM | OXYGEN SATURATION: 99 % | WEIGHT: 259.4 LBS | HEIGHT: 64 IN | SYSTOLIC BLOOD PRESSURE: 118 MMHG | BODY MASS INDEX: 44.28 KG/M2 | TEMPERATURE: 98 F

## 2024-04-03 DIAGNOSIS — R52 BODY ACHES: ICD-10-CM

## 2024-04-03 DIAGNOSIS — J06.9 VIRAL URI: Primary | ICD-10-CM

## 2024-04-03 DIAGNOSIS — R11.0 NAUSEA: ICD-10-CM

## 2024-04-03 DIAGNOSIS — R09.81 NASAL CONGESTION: ICD-10-CM

## 2024-04-03 LAB
INFLUENZA A ANTIBODY: NORMAL
INFLUENZA B ANTIBODY: NORMAL
Lab: NORMAL
PERFORMING INSTRUMENT: NORMAL
QC PASS/FAIL: NORMAL
SARS-COV-2, POC: NORMAL

## 2024-04-03 PROCEDURE — 87426 SARSCOV CORONAVIRUS AG IA: CPT | Performed by: STUDENT IN AN ORGANIZED HEALTH CARE EDUCATION/TRAINING PROGRAM

## 2024-04-03 PROCEDURE — 87804 INFLUENZA ASSAY W/OPTIC: CPT | Performed by: STUDENT IN AN ORGANIZED HEALTH CARE EDUCATION/TRAINING PROGRAM

## 2024-04-03 PROCEDURE — 99214 OFFICE O/P EST MOD 30 MIN: CPT | Performed by: STUDENT IN AN ORGANIZED HEALTH CARE EDUCATION/TRAINING PROGRAM

## 2024-04-03 RX ORDER — ONDANSETRON 4 MG/1
4 TABLET, ORALLY DISINTEGRATING ORAL 3 TIMES DAILY PRN
Qty: 21 TABLET | Refills: 0 | Status: SHIPPED | OUTPATIENT
Start: 2024-04-03

## 2024-04-03 RX ORDER — FLUTICASONE PROPIONATE 50 MCG
2 SPRAY, SUSPENSION (ML) NASAL DAILY
Qty: 16 G | Refills: 0 | Status: SHIPPED | OUTPATIENT
Start: 2024-04-03

## 2024-04-03 NOTE — PROGRESS NOTES
[facility] AND [facility]-IN [facility]  [address]  [address]  [place] [address]  Dept: [phone]  Dept Fax: [phone]  Loc: [phone]     4/3/2024    Visit type: Acute care    Reason for Visit: Nausea (Started last night Headache , dizzy, body aches )       ASSESSMENT/PLAN   1. Viral URI  -     fluticasone (FLONASE) 50 MCG/ACT nasal spray; 2 sprays by Each Nostril route daily, Disp-16 g, R-0Normal  2. Body aches  -     POCT COVID-19, Antigen  -     POCT Influenza A/B  3. Nasal congestion  -     fluticasone (FLONASE) 50 MCG/ACT nasal spray; 2 sprays by Each Nostril route daily, Disp-16 g, R-0Normal  4. Nausea  -     ondansetron (ZOFRAN-ODT) 4 MG disintegrating tablet; Take 1 tablet by mouth 3 times daily as needed for Nausea or Vomiting, Disp-21 tablet, R-0Normal      Reviewed medical, surgical, social and family history. Also reviewed current medications and allergies.  No follow-ups on file.  Orders Placed This Encounter   Medications    fluticasone (FLONASE) 50 MCG/ACT nasal spray     Si sprays by Each Nostril route daily     Dispense:  16 g     Refill:  0    ondansetron (ZOFRAN-ODT) 4 MG disintegrating tablet     Sig: Take 1 tablet by mouth 3 times daily as needed for Nausea or Vomiting     Dispense:  21 tablet     Refill:  0      Subjective    Patient: Kylie Lema is a 38 y.o. female     HPI: here today with cold symptoms     URI symptoms  - onset: started last night, originally thought it was food poisoning because she ate a cake and it made her nauseous.  All night she was nauseous. When she woke up this am, she had a pounding a headache, couldn't go to work. Feels like she is running a fever but is not hot. Her body is aching but she doesn't know if its from the sickness or from working out. But feels like she was hit by a truck. She is unsure if it is allergies or if she caught something. Says it feels like she is hung over but has not

## 2024-04-06 ENCOUNTER — OFFICE VISIT (OUTPATIENT)
Dept: OBGYN CLINIC | Age: 39
End: 2024-04-06
Payer: COMMERCIAL

## 2024-04-06 VITALS
BODY MASS INDEX: 44.29 KG/M2 | WEIGHT: 258 LBS | DIASTOLIC BLOOD PRESSURE: 76 MMHG | SYSTOLIC BLOOD PRESSURE: 128 MMHG | HEART RATE: 74 BPM

## 2024-04-06 DIAGNOSIS — L02.818 CUTANEOUS ABSCESS OF OTHER SITE: Primary | ICD-10-CM

## 2024-04-06 PROCEDURE — 99213 OFFICE O/P EST LOW 20 MIN: CPT | Performed by: ADVANCED PRACTICE MIDWIFE

## 2024-04-06 RX ORDER — SULFAMETHOXAZOLE AND TRIMETHOPRIM 800; 160 MG/1; MG/1
1 TABLET ORAL 2 TIMES DAILY
Qty: 20 TABLET | Refills: 0 | Status: SHIPPED | OUTPATIENT
Start: 2024-04-06 | End: 2024-04-16

## 2024-04-06 ASSESSMENT — ENCOUNTER SYMPTOMS
ABDOMINAL PAIN: 0
VOMITING: 0
SHORTNESS OF BREATH: 0
DIARRHEA: 0
NAUSEA: 0
COUGH: 0
CONSTIPATION: 0

## 2024-04-06 NOTE — PROGRESS NOTES
SUBJECTIVE:  Kylie Leam is a 38 y.o. female who presents here today for complaints of:      Chief Complaint   Patient presents with    Vaginal Cyst     Pt has noticed what seems like a cyst in her vaginal area for about 4 days now. It is very tender     Skin Abscess  Developed an area of increased pain and swelling about 3 days ago to the right outer labia.      Review of Systems   Respiratory:  Negative for cough and shortness of breath.    Gastrointestinal:  Negative for abdominal pain, constipation, diarrhea, nausea and vomiting.   Genitourinary:  Negative for difficulty urinating, dysuria, menstrual problem, pelvic pain, vaginal bleeding and vaginal discharge.   All other systems reviewed and are negative.    OBJECTIVE:  Vitals:  /76   Pulse 74   Wt 117 kg (258 lb)   LMP 03/03/2024   BMI 44.29 kg/m²     Physical Exam  Constitutional:       General: She is not in acute distress.     Appearance: Normal appearance. She is not ill-appearing.   HENT:      Mouth/Throat:      Mouth: Mucous membranes are moist.   Eyes:      General: No scleral icterus.        Right eye: No discharge.         Left eye: No discharge.   Cardiovascular:      Rate and Rhythm: Normal rate.   Pulmonary:      Effort: Pulmonary effort is normal. No respiratory distress.   Abdominal:      Palpations: Abdomen is soft.   Genitourinary:     Pubic Area: No rash or pubic lice.       Labia:         Right: Tenderness present. No rash, lesion or injury.         Left: No rash, tenderness, lesion or injury.       Urethra: No prolapse, urethral pain, urethral swelling or urethral lesion.      Rectum: Normal.       Musculoskeletal:         General: Normal range of motion.      Cervical back: Normal range of motion and neck supple.      Right lower leg: No edema.      Left lower leg: No edema.   Skin:     General: Skin is warm and dry.      Capillary Refill: Capillary refill takes less than 2 seconds.      Coloration: Skin is not jaundiced or

## 2024-04-25 ENCOUNTER — OFFICE VISIT (OUTPATIENT)
Dept: PRIMARY CARE CLINIC | Age: 39
End: 2024-04-25
Payer: COMMERCIAL

## 2024-04-25 VITALS
HEIGHT: 64 IN | OXYGEN SATURATION: 99 % | BODY MASS INDEX: 43.84 KG/M2 | SYSTOLIC BLOOD PRESSURE: 114 MMHG | HEART RATE: 81 BPM | WEIGHT: 256.8 LBS | DIASTOLIC BLOOD PRESSURE: 72 MMHG

## 2024-04-25 DIAGNOSIS — M76.61 ACHILLES TENDINITIS OF RIGHT LOWER EXTREMITY: Primary | ICD-10-CM

## 2024-04-25 PROCEDURE — 99213 OFFICE O/P EST LOW 20 MIN: CPT | Performed by: STUDENT IN AN ORGANIZED HEALTH CARE EDUCATION/TRAINING PROGRAM

## 2024-04-25 NOTE — PROGRESS NOTES
[facility] PRIMARY AND WALK-IN CARE  [address]  [address]  [place] [address]  Dept: [phone]  Dept Fax: [phone]  Loc: [phone]     4/25/2024    Visit type: Acute care    Reason for Visit: Foot Pain (Not getting better in the heal going up the back of leg )       ASSESSMENT/PLAN   1. Achilles tendinitis of right lower extremity  2/2 to fall last month   - returning again after one month due to non resolving pain in right achilles tendon with pain radiating up to right calf and right heel.   - on exam ROM wnl, able to ambulate with no issues. No swelling, non tender lateral malleolus. Non tender heel and right calf. Tender right achilles tendon.   - recommend to continue ibuprofen prn for pain, increase ice packs to 20 minutes 3-4 times per day, exercise and avoid excessive weight bearing.  - discussed long healing period, if not improving after another 1-2 months return for reassessment vs podiatry referral vs imaging.      No follow-ups on file.    No orders of the defined types were placed in this encounter.     Subjective    Patient: Kylie Lema is a 38 y.o. female     HPI: was seen about one month ago for heel pain. Was given a foot stretch exercise and taking flexeril and ibuprofen 600mg TID prn. Was told it was a strain which would take 2-3 weeks to heal. She feels its getting worse. Especially since few days ago she bent her foot wrong while going up the steps.        Review of Systems       Objective     Vitals:    04/25/24 0735   BP: 114/72   Site: Left Upper Arm   Position: Sitting   Cuff Size: Large Adult   Pulse: 81   SpO2: 99%   Weight: 116.5 kg (256 lb 12.8 oz)   Height: 1.626 m (5' 4\")     Physical Exam  Allergies   Allergen Reactions    Zolmitriptan Shortness Of Breath       Current Outpatient Medications:     fluticasone (FLONASE) 50 MCG/ACT nasal spray, 2 sprays by Each Nostril route daily, Disp: 16 g, Rfl: 0    ondansetron

## 2024-05-23 ENCOUNTER — OFFICE VISIT (OUTPATIENT)
Dept: FAMILY MEDICINE CLINIC | Age: 39
End: 2024-05-23
Payer: COMMERCIAL

## 2024-05-23 VITALS
BODY MASS INDEX: 43.54 KG/M2 | HEIGHT: 64 IN | OXYGEN SATURATION: 100 % | WEIGHT: 255 LBS | SYSTOLIC BLOOD PRESSURE: 110 MMHG | HEART RATE: 73 BPM | DIASTOLIC BLOOD PRESSURE: 80 MMHG

## 2024-05-23 DIAGNOSIS — R63.5 ABNORMAL WEIGHT GAIN: Primary | ICD-10-CM

## 2024-05-23 PROCEDURE — 99214 OFFICE O/P EST MOD 30 MIN: CPT | Performed by: STUDENT IN AN ORGANIZED HEALTH CARE EDUCATION/TRAINING PROGRAM

## 2024-05-23 RX ORDER — PHENTERMINE HYDROCHLORIDE 37.5 MG/1
37.5 TABLET ORAL
Qty: 30 TABLET | Refills: 0 | Status: SHIPPED | OUTPATIENT
Start: 2024-05-23 | End: 2024-06-22

## 2024-05-23 ASSESSMENT — ENCOUNTER SYMPTOMS
VOMITING: 0
SHORTNESS OF BREATH: 0
SORE THROAT: 0
ABDOMINAL PAIN: 0
COUGH: 0
SINUS PRESSURE: 0

## 2024-05-23 NOTE — PROGRESS NOTES
Weight management  Kylie Lema  YOB: 1985 Age: 38 y.o.  Sex: female  Date of Assessment:  5/23/2024  PCP: Purnima Valdez MD  Referred by: No ref. provider found     Chief Complaint   Patient presents with    Weight Management     States she has been off adipex for 1 month     HPI  Patient is here today with interest in weight loss.     Weight History  How does your weight affect your life and health?  I injured my back recently and it partly caused weight gain, I find it difficult to do tasks, I am not happy with my weight    When did you first notice that you were gaining weight?   [x]  Childhood  []  Teens []  Adulthood    []  Pregnancy  []  Menopause      How much did you weigh:   1 year ago?  250 lbs  5 years ago?  260 lbs  10 years ago?  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  20 pounds  What are your greatest challenges with dieting?  Forming good/healthy habits, stress eating, I cannot stand most diabetes    Medications  Have you ever taken medication to lose weight? (check all that apply):       [] Phentermine (Adipex) []  Topamax   [] Qsymia    []  Bupropion (Wellbutrin)      []  Contrave    [] Xenical/Ali  [] Saxenda   []

## 2024-06-11 RX ORDER — ACETAMINOPHEN AND CODEINE PHOSPHATE 120; 12 MG/5ML; MG/5ML
1 SOLUTION ORAL DAILY
Qty: 3 TABLET | Refills: 3 | Status: CANCELLED | OUTPATIENT
Start: 2024-06-11

## 2024-06-11 RX ORDER — NORETHINDRONE 0.35 MG/1
1 TABLET ORAL DAILY
Qty: 28 TABLET | Refills: 0 | Status: SHIPPED | OUTPATIENT
Start: 2024-06-11

## 2024-07-08 RX ORDER — ACETAMINOPHEN AND CODEINE PHOSPHATE 120; 12 MG/5ML; MG/5ML
1 SOLUTION ORAL DAILY
Qty: 28 TABLET | Refills: 0 | Status: SHIPPED | OUTPATIENT
Start: 2024-07-08

## 2024-07-30 ENCOUNTER — OFFICE VISIT (OUTPATIENT)
Dept: OBGYN CLINIC | Age: 39
End: 2024-07-30
Payer: COMMERCIAL

## 2024-07-30 VITALS
BODY MASS INDEX: 42.68 KG/M2 | DIASTOLIC BLOOD PRESSURE: 68 MMHG | WEIGHT: 250 LBS | HEIGHT: 64 IN | SYSTOLIC BLOOD PRESSURE: 112 MMHG

## 2024-07-30 DIAGNOSIS — Z01.419 ENCOUNTER FOR WELL WOMAN EXAM WITH ROUTINE GYNECOLOGICAL EXAM: Primary | ICD-10-CM

## 2024-07-30 PROCEDURE — 99395 PREV VISIT EST AGE 18-39: CPT | Performed by: OBSTETRICS & GYNECOLOGY

## 2024-07-30 RX ORDER — ACETAMINOPHEN AND CODEINE PHOSPHATE 120; 12 MG/5ML; MG/5ML
1 SOLUTION ORAL DAILY
Qty: 28 TABLET | Refills: 11 | Status: SHIPPED | OUTPATIENT
Start: 2024-07-30

## 2024-07-30 SDOH — ECONOMIC STABILITY: FOOD INSECURITY: WITHIN THE PAST 12 MONTHS, YOU WORRIED THAT YOUR FOOD WOULD RUN OUT BEFORE YOU GOT MONEY TO BUY MORE.: NEVER TRUE

## 2024-07-30 SDOH — ECONOMIC STABILITY: INCOME INSECURITY: HOW HARD IS IT FOR YOU TO PAY FOR THE VERY BASICS LIKE FOOD, HOUSING, MEDICAL CARE, AND HEATING?: NOT HARD AT ALL

## 2024-07-30 SDOH — ECONOMIC STABILITY: FOOD INSECURITY: WITHIN THE PAST 12 MONTHS, THE FOOD YOU BOUGHT JUST DIDN'T LAST AND YOU DIDN'T HAVE MONEY TO GET MORE.: NEVER TRUE

## 2024-07-30 ASSESSMENT — ENCOUNTER SYMPTOMS
DIARRHEA: 0
EYES NEGATIVE: 1
RESPIRATORY NEGATIVE: 1
BLOOD IN STOOL: 0
ANAL BLEEDING: 0
NAUSEA: 0
ABDOMINAL PAIN: 0
ALLERGIC/IMMUNOLOGIC NEGATIVE: 1
RECTAL PAIN: 0
CONSTIPATION: 0
VOMITING: 0
ABDOMINAL DISTENTION: 0

## 2024-07-30 ASSESSMENT — VISUAL ACUITY: OU: 1

## 2024-08-09 ENCOUNTER — OFFICE VISIT (OUTPATIENT)
Dept: PRIMARY CARE CLINIC | Age: 39
End: 2024-08-09
Payer: COMMERCIAL

## 2024-08-09 VITALS
SYSTOLIC BLOOD PRESSURE: 112 MMHG | HEART RATE: 76 BPM | BODY MASS INDEX: 43.16 KG/M2 | WEIGHT: 252.8 LBS | OXYGEN SATURATION: 99 % | HEIGHT: 64 IN | DIASTOLIC BLOOD PRESSURE: 76 MMHG

## 2024-08-09 DIAGNOSIS — M76.61 ACHILLES TENDINITIS OF RIGHT LOWER EXTREMITY: Primary | ICD-10-CM

## 2024-08-09 DIAGNOSIS — E66.01 CLASS 3 SEVERE OBESITY DUE TO EXCESS CALORIES WITHOUT SERIOUS COMORBIDITY WITH BODY MASS INDEX (BMI) OF 40.0 TO 44.9 IN ADULT (HCC): ICD-10-CM

## 2024-08-09 PROCEDURE — 99213 OFFICE O/P EST LOW 20 MIN: CPT | Performed by: STUDENT IN AN ORGANIZED HEALTH CARE EDUCATION/TRAINING PROGRAM

## 2024-08-09 NOTE — PROGRESS NOTES
[facility] PRIMARY AND WALK-IN CARE  [address]  [address]  [place] [address]  Dept: [phone]  Dept Fax: [phone]  Loc: [phone]     8/9/2024    Visit type: Acute care    Reason for Visit: Ankle Pain (Right ankle , back of heel , Limping more to where her left knee is hurting now )       ASSESSMENT/PLAN   1. Achilles tendinitis of right lower extremity  Assessment & Plan:  Chronic, pain worsening, will now order XR and refer to sports med   Orders:  -     XR ANKLE RIGHT (MIN 3 VIEWS); Future  -     Ambulatory Referral To Sports Medicine  2. Class 3 severe obesity due to excess calories without serious comorbidity with body mass index (BMI) of 40.0 to 44.9 in adult (HCC)  Assessment & Plan:  Chronic; improving BMI today 43    - continue ibuprofen for pain and elevate legs     No follow-ups on file.  No orders of the defined types were placed in this encounter.     Subjective    Patient: Kylie Lema is a 39 y.o. female     HPI: this is the 3rd time coming in for it. Was told it could be achilles tendon she pulled. The 2nd time: was told it will take a couple of months to heal. Started gaining weight because she was not working out anymore. So restarted working out and now the pain is getting worse. Feels tight. Located to right foot back of heal and back of achilles tendon. Pain shoots down to heel.        Review of Systems   Objective     Vitals:    08/09/24 1553   BP: 112/76   Site: Left Upper Arm   Position: Sitting   Cuff Size: Large Adult   Pulse: 76   SpO2: 99%   Weight: 114.7 kg (252 lb 12.8 oz)   Height: 1.626 m (5' 4\")     Physical Exam  Allergies   Allergen Reactions    Zolmitriptan Shortness Of Breath       Current Outpatient Medications:     norethindrone (JENCYCLA) 0.35 MG tablet, Take 1 tablet by mouth daily, Disp: 28 tablet, Rfl: 11    fluticasone (FLONASE) 50 MCG/ACT nasal spray, 2 sprays by Each Nostril route daily, Disp: 16 g,

## 2024-08-20 ENCOUNTER — OFFICE VISIT (OUTPATIENT)
Dept: FAMILY MEDICINE CLINIC | Age: 39
End: 2024-08-20
Payer: COMMERCIAL

## 2024-08-20 VITALS
DIASTOLIC BLOOD PRESSURE: 76 MMHG | SYSTOLIC BLOOD PRESSURE: 100 MMHG | BODY MASS INDEX: 43.54 KG/M2 | WEIGHT: 255 LBS | OXYGEN SATURATION: 97 % | HEIGHT: 64 IN | HEART RATE: 88 BPM

## 2024-08-20 DIAGNOSIS — M76.61 ACHILLES TENDINITIS OF RIGHT LOWER EXTREMITY: Primary | ICD-10-CM

## 2024-08-20 PROCEDURE — 99213 OFFICE O/P EST LOW 20 MIN: CPT | Performed by: FAMILY MEDICINE

## 2024-08-20 RX ORDER — NAPROXEN 500 MG/1
500 TABLET ORAL 2 TIMES DAILY WITH MEALS
Qty: 60 TABLET | Refills: 1 | Status: SHIPPED | OUTPATIENT
Start: 2024-08-20

## 2024-08-20 SDOH — HEALTH STABILITY: PHYSICAL HEALTH: ON AVERAGE, HOW MANY DAYS PER WEEK DO YOU ENGAGE IN MODERATE TO STRENUOUS EXERCISE (LIKE A BRISK WALK)?: 2 DAYS

## 2024-08-20 SDOH — HEALTH STABILITY: PHYSICAL HEALTH: ON AVERAGE, HOW MANY MINUTES DO YOU ENGAGE IN EXERCISE AT THIS LEVEL?: 10 MIN

## 2024-08-20 ASSESSMENT — ENCOUNTER SYMPTOMS: BACK PAIN: 0

## 2024-08-20 NOTE — PROGRESS NOTES
[facility] SPECIALTY/PRIMARY CARE  [address]  [place] [address]  Dept: [phone]  Dept Fax: [phone]  Loc: [phone]     8/20/2024    Visit type: Follow up    Reason for Visit: Foot Pain (Pt states she was seen by her pcp and was dx of achilles tendinitis of Rt lower leg 08/09/24, minimal swelling on Rt ankle, limited ROM, has been taking Ibuprofen 600 mg w/ mild improvement. Sx x6 mos.   Denies injury or trauma. )         Patient: Kylie Lema is a 39 y.o. female     HPI: 39-year-old female presents with right foot/Achilles pain that started on 8/9/2024.  Patient has been taking ibuprofen with mild improvement.  Patient denies any specific injury or trauma.  Patient has already had an x-ray of the ankle, I reviewed the x-ray which showed calcification of the Achilles insertion consistent with enthesopathy.  ASSESSMENT/PLAN   Achilles tendinitis of right lower extremity  -     Ambulatory referral to Physical Therapy  -     naproxen (NAPROSYN) 500 MG tablet; Take 1 tablet by mouth 2 times daily (with meals), Disp-60 tablet, R-1Normal  -     diclofenac sodium (VOLTAREN) 1 % GEL; Apply 4 g topically 4 times daily, Topical, 4 TIMES DAILY Starting Tue 8/20/2024, Disp-50 g, R-1, Normal     -Tenderness to palpation of the right Achilles tendon at the mid substance and insertion.  Villalba's test is negative, no defect felt.  Strength mildly limited secondary to pain.  No sensation deficits.  Dorsalis pedis pulse normal.  -Discussed use of Achilles sleeve and heel cup, these were written down for the patient to get over-the-counter.  Discussed activity modification, discussed home stretching program and provided physician driven stretching program.  -PT ordered, naproxen prescribed, follow-up in 4 to 6 weeks for reassessment      Orders Placed This Encounter   Medications    naproxen (NAPROSYN) 500 MG tablet     Sig: Take 1 tablet by mouth 2 times

## 2024-08-29 ENCOUNTER — HOSPITAL ENCOUNTER (OUTPATIENT)
Dept: PHYSICAL THERAPY | Age: 39
Setting detail: THERAPIES SERIES
Discharge: HOME | End: 2024-08-29
Payer: COMMERCIAL

## 2024-08-29 PROCEDURE — 97162 PT EVAL MOD COMPLEX 30 MIN: CPT

## 2024-08-29 NOTE — PLAN OF CARE
AREAS ON FACE TWICE DAILY FOR 2 WEEKS  T      mometasone (ELOCON) 0.1 % cream MIX A PEA SIZED AMOUNT WITH A PEA SIZED AMOUNT OF KETOCONAZOLE CREAM. APPLY A THIN AMOUNT TO AFFECTED AREAS ON FACE TWICE DAILY FOR 2 WEEKS.      cetirizine (ZYRTEC) 10 MG tablet Take 1 tablet by mouth nightly      montelukast (SINGULAIR) 10 MG tablet take 1 tablet by mouth nightly at bedtime. Can add to cetirizine if needed (Patient not taking: Reported on 8/9/2024) 30 tablet 5     No current facility-administered medications for this encounter.       Allergies: Zolmitriptan    Family Member Present: no    Occupation:  service advisior . Physical demands include: walking, standing.  Status: full time.    Hobbies/Recreational interests: working out, rock climbing (would like to get back)      REPORTED FUNCTIONAL STATUS  Home:  Lives with  family  in a 2 level   Self-Care Dressing Independent    Bathing Independent   Home Management Cooking Independent    Cleaning Independent    Laundry Independent   Driving Distance Independent     Precautions/Contraindications/Restrictions: none    OBJECTIVE:     Estimated body mass index is 43.77 kg/m² as calculated from the following:    Height as of 8/20/24: 1.626 m (5' 4\").    Weight as of 8/20/24: 115.7 kg (255 lb).     Observations: Rounded shoulders    Edema: in mid Achilles on right    Gait:   Description: antalgic gait  Distance: clinical distance in department  Level of Assistance: modified independence  Assistive Device: None  Comments:      Balance Screen: good seated balance, good standing balance    Bed Mobility: modified independence  Comments:    Transfers: modified independence  Comments:    Joint/Motion:    Range of Motion  AROM RLE (degrees)  R Ankle Dorsiflexion (0-20): neutral  R Ankle Plantar Flexion (0-45): 50 deg  R Ankle Forefoot Inversion (0-40): 25 deg  R Ankle Forefoot Eversion (0-20): 8 deg  AROM LLE (degrees)  L Ankle Dorsiflexion (0-20): 5 deg         Strength  Strength  prescribed    Evaluation and patient rights have been reviewed and patient agrees with plan of care.  Yes  [x]  No  []   Explain:     Corley Fall Risk Assessment  Risk Factor Scale  Score   History of Falls [] Yes  [x] No 25  0 0   Secondary Diagnosis [] Yes  [x] No 15  0 0   Ambulatory Aid [] Furniture  [] Crutches/cane/walker  [x] None/bedrest/wheelchair/nurse 30  15  0 0   IV/Heparin Lock [] Yes  [x] No 20  0 0   Gait/Transferring [] Impaired  [x] Weak  [] Normal/bedrest/immobile 20  10  0 10   Mental Status [] Forgets limitations  [x] Oriented to own ability 15  0 0      Total:10     Based on the Assessment score: check the appropriate box.  [x]  No intervention needed   Low =   Score of 0-24  []  Use standard prevention interventions Moderate =  Score of 24-44   [] Discuss fall prevention strategies   [] Indicate moderate falls risk on eval  []  Use high risk prevention interventions High = Score of 45 and higher   [] Discuss fall prevention strategies   [] Provide supervision during treatment time    Minutes  PT Individual Minutes  Time In: 0810  Time Out: 0840  Minutes: 30  Timed Code Treatment Minutes: 5 Minutes  Procedure Minutes: 25 PT evaluation minutes     Timed Activity Minutes Units   Ther Ex 5 0   Manual      Neuro Marcial       Electronically signed by Emeli Mazariegos PT on 8/29/24 at 8:12 AM EDT           Please sign Physician's Certification and return to:   [facility]  [address] [place] [address]  Phone: [phone]  Fax: [phone]    Physician's Certification / Comments     Patient to be seen 2 times per week for 4-6 weeks weeks  Certification period from 8/29/2024  to  9/29/24    If you have any questions or concerns, please don't hesitate to call.  Thank you for your referral.    I have reviewed this plan of care and certify a need for medically necessary rehabilitation services.    Physician Signature:__________________________________________________________  Date:  Please

## 2024-09-05 ENCOUNTER — HOSPITAL ENCOUNTER (OUTPATIENT)
Dept: PHYSICAL THERAPY | Age: 39
Setting detail: THERAPIES SERIES
Discharge: HOME | End: 2024-09-05
Payer: COMMERCIAL

## 2024-09-05 PROCEDURE — 97110 THERAPEUTIC EXERCISES: CPT

## 2024-09-05 ASSESSMENT — PAIN DESCRIPTION - ORIENTATION: ORIENTATION: RIGHT

## 2024-09-05 ASSESSMENT — PAIN DESCRIPTION - DESCRIPTORS: DESCRIPTORS: ACHING;OTHER (COMMENT)

## 2024-09-05 ASSESSMENT — PAIN DESCRIPTION - LOCATION: LOCATION: ANKLE

## 2024-09-05 ASSESSMENT — PAIN SCALES - GENERAL: PAINLEVEL_OUTOF10: 4

## 2024-09-05 NOTE — PROGRESS NOTES
[address]   [place] [address]  Phone:[phone]      Physical TherapyTreatment Note        Date: 2024  Patient: Kylie Lema  : 1985   Confirmed: Yes  MRN: [id number]  Referring Provider: Bucky Mendez MD  Medical Diagnosis: Achilles tendinitis of right lower extremity [M76.61]   Treatment Diagnosis: right Achilles tendon pain, impaired right ankle ROM    Visit Information:  Insurance: Payor: [employer] [place] / Plan: [employer] [place] / Product Type: *No Product type* /   PT Visit Information  PT Insurance Information: [employer]  Total # of Visits Approved: 30  Total # of Visits to Date: 2  No Show: 0  Canceled Appointment: 0  Progress Note Counter: -    Subjective Information:  Subjective: Pt states that her R achilles pain is a 4/10 but her L knee is acting up today and is a 6/10. She wonderinf if it's from over compensating for the right. Been doing her stretches at home and throughout her work day.  HEP Compliance:  [x] Good [] Fair  [] Poor [] Reports not doing due to:    Pain Screening  Patient Currently in Pain: Yes  Pain Assessment: 0-10  Pain Level: 4  Pain Location: Ankle (achilles)  Pain Orientation: Right  Pain Descriptors: Aching, Other (Comment) (feels swollen)    Treatment:  Exercises:  Exercises  Exercise 1: long sitting gastroc stretch with strap 30 sec hold x 3  Exercise 2: ankle circles x 10  Exercise 3: seated ankle pumps x 10, ankle inversion/eversion x 10  Exercise 4: soleus stretch*  Exercise 5: BAPs board*  Exercise 6: ankle Tband*  Exercise 7: 3 way hip*  Exercise 8: clamshells*  Exercise 9: bridges*  Exercise 10: ankle ABCs x 1  Exercise 11: standing gastroc stretch*  Exercise 12: SLS*      Modalities:  Ultrasound (CPT 88403)  Patient Position: Prone  Ultrasound location: Right (achilles)  Ultrasound specified location: right Achilles  Ultrasound frequency: 3 MHz  Ultrasound intensity (W/cm2): 1.2  Ultrasound mode: Pulsed

## 2024-09-09 ENCOUNTER — HOSPITAL ENCOUNTER (EMERGENCY)
Age: 39
Discharge: HOME | End: 2024-09-09
Payer: COMMERCIAL

## 2024-09-09 ENCOUNTER — APPOINTMENT (OUTPATIENT)
Dept: GENERAL RADIOLOGY | Age: 39
End: 2024-09-09
Payer: COMMERCIAL

## 2024-09-09 VITALS
OXYGEN SATURATION: 100 % | BODY MASS INDEX: 42.68 KG/M2 | DIASTOLIC BLOOD PRESSURE: 84 MMHG | RESPIRATION RATE: 18 BRPM | SYSTOLIC BLOOD PRESSURE: 156 MMHG | TEMPERATURE: 98.1 F | HEIGHT: 64 IN | WEIGHT: 250 LBS | HEART RATE: 76 BPM

## 2024-09-09 DIAGNOSIS — M54.50 ACUTE LEFT-SIDED LOW BACK PAIN WITHOUT SCIATICA: ICD-10-CM

## 2024-09-09 DIAGNOSIS — M25.562 ACUTE PAIN OF LEFT KNEE: Primary | ICD-10-CM

## 2024-09-09 PROCEDURE — 96372 THER/PROPH/DIAG INJ SC/IM: CPT

## 2024-09-09 PROCEDURE — 2500000003 HC RX 250 WO HCPCS

## 2024-09-09 PROCEDURE — 73560 X-RAY EXAM OF KNEE 1 OR 2: CPT

## 2024-09-09 PROCEDURE — 72110 X-RAY EXAM L-2 SPINE 4/>VWS: CPT

## 2024-09-09 PROCEDURE — 99284 EMERGENCY DEPT VISIT MOD MDM: CPT

## 2024-09-09 PROCEDURE — 6370000000 HC RX 637 (ALT 250 FOR IP)

## 2024-09-09 PROCEDURE — 6360000002 HC RX W HCPCS

## 2024-09-09 RX ORDER — KETOROLAC TROMETHAMINE 10 MG/1
10 TABLET, FILM COATED ORAL EVERY 6 HOURS PRN
Qty: 20 TABLET | Refills: 0 | Status: SHIPPED | OUTPATIENT
Start: 2024-09-09

## 2024-09-09 RX ORDER — KETOROLAC TROMETHAMINE 30 MG/ML
30 INJECTION, SOLUTION INTRAMUSCULAR; INTRAVENOUS ONCE
Status: COMPLETED | OUTPATIENT
Start: 2024-09-09 | End: 2024-09-09

## 2024-09-09 RX ORDER — METHOCARBAMOL 500 MG/1
1500 TABLET, FILM COATED ORAL ONCE
Status: COMPLETED | OUTPATIENT
Start: 2024-09-09 | End: 2024-09-09

## 2024-09-09 RX ORDER — METHOCARBAMOL 750 MG/1
750 TABLET, FILM COATED ORAL 4 TIMES DAILY PRN
Qty: 40 TABLET | Refills: 0 | Status: SHIPPED | OUTPATIENT
Start: 2024-09-09 | End: 2024-09-19

## 2024-09-09 RX ADMIN — KETOROLAC TROMETHAMINE 30 MG: 30 INJECTION, SOLUTION INTRAMUSCULAR at 22:23

## 2024-09-09 RX ADMIN — METHYLPREDNISOLONE SODIUM SUCCINATE 60 MG: 125 INJECTION INTRAMUSCULAR; INTRAVENOUS at 22:23

## 2024-09-09 RX ADMIN — METHOCARBAMOL TABLETS 1500 MG: 500 TABLET, COATED ORAL at 22:23

## 2024-09-09 ASSESSMENT — PAIN DESCRIPTION - ORIENTATION: ORIENTATION: LEFT

## 2024-09-09 ASSESSMENT — PAIN - FUNCTIONAL ASSESSMENT: PAIN_FUNCTIONAL_ASSESSMENT: 0-10

## 2024-09-09 ASSESSMENT — ENCOUNTER SYMPTOMS
ABDOMINAL PAIN: 0
VOMITING: 0
DIARRHEA: 0
NAUSEA: 0
BACK PAIN: 1
SHORTNESS OF BREATH: 0
PHOTOPHOBIA: 0
COUGH: 0

## 2024-09-09 ASSESSMENT — PAIN DESCRIPTION - DESCRIPTORS: DESCRIPTORS: ACHING;THROBBING;DULL

## 2024-09-09 ASSESSMENT — PAIN DESCRIPTION - LOCATION: LOCATION: BACK;KNEE

## 2024-09-09 ASSESSMENT — LIFESTYLE VARIABLES
HOW MANY STANDARD DRINKS CONTAINING ALCOHOL DO YOU HAVE ON A TYPICAL DAY: PATIENT DOES NOT DRINK
HOW OFTEN DO YOU HAVE A DRINK CONTAINING ALCOHOL: NEVER

## 2024-09-09 ASSESSMENT — PAIN DESCRIPTION - PAIN TYPE: TYPE: ACUTE PAIN

## 2024-09-09 ASSESSMENT — PAIN SCALES - GENERAL: PAINLEVEL_OUTOF10: 5

## 2024-09-10 ENCOUNTER — OFFICE VISIT (OUTPATIENT)
Dept: FAMILY MEDICINE CLINIC | Age: 39
End: 2024-09-10
Payer: COMMERCIAL

## 2024-09-10 ENCOUNTER — HOSPITAL ENCOUNTER (OUTPATIENT)
Dept: PHYSICAL THERAPY | Age: 39
Setting detail: THERAPIES SERIES
Discharge: HOME | End: 2024-09-10
Payer: COMMERCIAL

## 2024-09-10 VITALS
WEIGHT: 253.4 LBS | HEART RATE: 95 BPM | HEIGHT: 64 IN | DIASTOLIC BLOOD PRESSURE: 76 MMHG | TEMPERATURE: 98.9 F | BODY MASS INDEX: 43.26 KG/M2 | OXYGEN SATURATION: 98 % | SYSTOLIC BLOOD PRESSURE: 110 MMHG

## 2024-09-10 DIAGNOSIS — M17.12 PRIMARY OSTEOARTHRITIS OF LEFT KNEE: Primary | ICD-10-CM

## 2024-09-10 DIAGNOSIS — M76.61 ACHILLES TENDINITIS OF RIGHT LOWER EXTREMITY: ICD-10-CM

## 2024-09-10 PROCEDURE — 99214 OFFICE O/P EST MOD 30 MIN: CPT | Performed by: FAMILY MEDICINE

## 2024-09-10 PROCEDURE — 97035 APP MDLTY 1+ULTRASOUND EA 15: CPT

## 2024-09-10 PROCEDURE — 97110 THERAPEUTIC EXERCISES: CPT

## 2024-09-10 ASSESSMENT — ENCOUNTER SYMPTOMS: BACK PAIN: 1

## 2024-09-10 ASSESSMENT — PAIN DESCRIPTION - ORIENTATION: ORIENTATION: RIGHT

## 2024-09-10 ASSESSMENT — PAIN DESCRIPTION - LOCATION: LOCATION: ANKLE

## 2024-09-10 ASSESSMENT — PAIN DESCRIPTION - DESCRIPTORS: DESCRIPTORS: ACHING

## 2024-09-10 ASSESSMENT — PAIN SCALES - GENERAL: PAINLEVEL_OUTOF10: 3

## 2024-09-13 ENCOUNTER — HOSPITAL ENCOUNTER (OUTPATIENT)
Dept: PHYSICAL THERAPY | Age: 39
Setting detail: THERAPIES SERIES
Discharge: HOME | End: 2024-09-13
Payer: COMMERCIAL

## 2024-09-13 PROCEDURE — 97035 APP MDLTY 1+ULTRASOUND EA 15: CPT

## 2024-09-13 PROCEDURE — 97110 THERAPEUTIC EXERCISES: CPT

## 2024-09-13 ASSESSMENT — PAIN DESCRIPTION - DESCRIPTORS: DESCRIPTORS: ACHING

## 2024-09-13 ASSESSMENT — PAIN DESCRIPTION - LOCATION: LOCATION: ANKLE

## 2024-09-13 ASSESSMENT — PAIN DESCRIPTION - ORIENTATION: ORIENTATION: RIGHT

## 2024-09-13 ASSESSMENT — PAIN SCALES - GENERAL: PAINLEVEL_OUTOF10: 2

## 2024-09-17 ENCOUNTER — HOSPITAL ENCOUNTER (OUTPATIENT)
Dept: PHYSICAL THERAPY | Age: 39
Setting detail: THERAPIES SERIES
Discharge: HOME | End: 2024-09-17
Payer: COMMERCIAL

## 2024-09-17 PROCEDURE — 97162 PT EVAL MOD COMPLEX 30 MIN: CPT

## 2024-09-17 PROCEDURE — 97110 THERAPEUTIC EXERCISES: CPT

## 2024-09-17 ASSESSMENT — PAIN SCALES - GENERAL: PAINLEVEL_OUTOF10: 2

## 2024-09-17 ASSESSMENT — PAIN DESCRIPTION - LOCATION: LOCATION: ANKLE

## 2024-09-17 ASSESSMENT — PAIN DESCRIPTION - PAIN TYPE: TYPE: CHRONIC PAIN

## 2024-09-17 ASSESSMENT — PAIN DESCRIPTION - ORIENTATION: ORIENTATION: RIGHT

## 2024-09-17 ASSESSMENT — PAIN DESCRIPTION - DESCRIPTORS: DESCRIPTORS: ACHING

## 2024-09-20 ENCOUNTER — HOSPITAL ENCOUNTER (OUTPATIENT)
Dept: PHYSICAL THERAPY | Age: 39
Setting detail: THERAPIES SERIES
Discharge: HOME | End: 2024-09-20
Payer: COMMERCIAL

## 2024-09-20 PROCEDURE — 97035 APP MDLTY 1+ULTRASOUND EA 15: CPT

## 2024-09-20 PROCEDURE — 97110 THERAPEUTIC EXERCISES: CPT

## 2024-09-20 ASSESSMENT — PAIN DESCRIPTION - DESCRIPTORS: DESCRIPTORS: ACHING

## 2024-09-20 ASSESSMENT — PAIN DESCRIPTION - ORIENTATION: ORIENTATION: RIGHT;LEFT

## 2024-09-20 ASSESSMENT — PAIN DESCRIPTION - LOCATION: LOCATION: ANKLE;KNEE

## 2024-09-20 ASSESSMENT — PAIN SCALES - GENERAL: PAINLEVEL_OUTOF10: 6

## 2024-09-24 ENCOUNTER — OFFICE VISIT (OUTPATIENT)
Dept: PRIMARY CARE CLINIC | Age: 39
End: 2024-09-24
Payer: COMMERCIAL

## 2024-09-24 ENCOUNTER — OFFICE VISIT (OUTPATIENT)
Dept: FAMILY MEDICINE CLINIC | Age: 39
End: 2024-09-24
Payer: COMMERCIAL

## 2024-09-24 VITALS
HEART RATE: 86 BPM | TEMPERATURE: 98.4 F | WEIGHT: 254.2 LBS | SYSTOLIC BLOOD PRESSURE: 128 MMHG | DIASTOLIC BLOOD PRESSURE: 78 MMHG | OXYGEN SATURATION: 98 % | BODY MASS INDEX: 43.63 KG/M2

## 2024-09-24 VITALS
OXYGEN SATURATION: 100 % | DIASTOLIC BLOOD PRESSURE: 78 MMHG | BODY MASS INDEX: 43.19 KG/M2 | HEIGHT: 64 IN | WEIGHT: 253 LBS | HEART RATE: 82 BPM | SYSTOLIC BLOOD PRESSURE: 108 MMHG

## 2024-09-24 DIAGNOSIS — J01.90 ACUTE SINUSITIS, RECURRENCE NOT SPECIFIED, UNSPECIFIED LOCATION: ICD-10-CM

## 2024-09-24 DIAGNOSIS — S83.8X2D INJURY OF MENISCUS OF LEFT KNEE, SUBSEQUENT ENCOUNTER: Primary | ICD-10-CM

## 2024-09-24 DIAGNOSIS — R42 DIZZINESS: Primary | ICD-10-CM

## 2024-09-24 DIAGNOSIS — M76.61 ACHILLES TENDINITIS OF RIGHT LOWER EXTREMITY: ICD-10-CM

## 2024-09-24 PROCEDURE — 99214 OFFICE O/P EST MOD 30 MIN: CPT | Performed by: FAMILY MEDICINE

## 2024-09-24 PROCEDURE — 99214 OFFICE O/P EST MOD 30 MIN: CPT | Performed by: STUDENT IN AN ORGANIZED HEALTH CARE EDUCATION/TRAINING PROGRAM

## 2024-09-24 RX ORDER — CYCLOBENZAPRINE HCL 5 MG
5 TABLET ORAL 3 TIMES DAILY PRN
Qty: 42 TABLET | Refills: 0 | Status: SHIPPED | OUTPATIENT
Start: 2024-09-24 | End: 2024-10-08

## 2024-09-24 RX ORDER — MECLIZINE HYDROCHLORIDE 25 MG/1
25 TABLET ORAL 3 TIMES DAILY PRN
Qty: 42 TABLET | Refills: 0 | Status: SHIPPED | OUTPATIENT
Start: 2024-09-24 | End: 2024-10-08

## 2024-09-24 ASSESSMENT — ENCOUNTER SYMPTOMS: BACK PAIN: 0

## 2024-09-26 ENCOUNTER — HOSPITAL ENCOUNTER (OUTPATIENT)
Dept: PHYSICAL THERAPY | Age: 39
Setting detail: THERAPIES SERIES
Discharge: HOME | End: 2024-09-26
Payer: COMMERCIAL

## 2024-09-26 PROCEDURE — 97110 THERAPEUTIC EXERCISES: CPT

## 2024-09-26 PROCEDURE — G0283 ELEC STIM OTHER THAN WOUND: HCPCS

## 2024-09-26 ASSESSMENT — PAIN SCALES - GENERAL: PAINLEVEL_OUTOF10: 6

## 2024-10-01 ENCOUNTER — HOSPITAL ENCOUNTER (OUTPATIENT)
Dept: PHYSICAL THERAPY | Age: 39
Setting detail: THERAPIES SERIES
Discharge: HOME | End: 2024-10-01
Payer: COMMERCIAL

## 2024-10-01 PROCEDURE — 97110 THERAPEUTIC EXERCISES: CPT

## 2024-10-01 PROCEDURE — G0283 ELEC STIM OTHER THAN WOUND: HCPCS

## 2024-10-01 ASSESSMENT — PAIN DESCRIPTION - PAIN TYPE: TYPE: CHRONIC PAIN

## 2024-10-01 ASSESSMENT — PAIN SCALES - GENERAL: PAINLEVEL_OUTOF10: 4

## 2024-10-01 ASSESSMENT — PAIN DESCRIPTION - DESCRIPTORS: DESCRIPTORS: ACHING

## 2024-10-01 ASSESSMENT — PAIN DESCRIPTION - ORIENTATION: ORIENTATION: RIGHT;LEFT

## 2024-10-01 ASSESSMENT — PAIN DESCRIPTION - LOCATION: LOCATION: ANKLE;KNEE

## 2024-10-01 NOTE — PROGRESS NOTES
[address]   [place] [id number]  Phone:[phone]      Physical TherapyTreatment Note        Date: 10/1/2024  Patient: Kylie Lema  : 1985   Confirmed: Yes  MRN: [id number]  Referring Provider: Bucky Mendez MD      Medical Diagnosis: Achilles tendinitis of right lower extremity [M76.61]   Unilateral primary osteoarthritis, left knee [M17.12]        Treatment Diagnosis: right Achilles tendon pain, impaired right ankle ROM    Visit Information:  Insurance: Payor: [employer] [place] / Plan: [employer] [place] / Product Type: *No Product type* /   PT Visit Information  PT Insurance Information: [employer]  Total # of Visits Approved: 30  Total # of Visits to Date: 8  No Show: 0  Canceled Appointment: 0  Progress Note Counter: -    Subjective Information:  Subjective: R achilles is tender from the shoes touching it.  2/10, First get moving everything is tight.  Left knee is 4/10 with pain on the side of the knee down.  HEP Compliance:  [x] Good [] Fair [] Poor [] Reports not doing due to:    Pain Screening  Patient Currently in Pain: Yes  Pain Assessment: 0-10  Pain Level: 4  Pain Type: Chronic pain  Pain Location: Ankle, Knee  Pain Orientation: Right, Left  Pain Descriptors: Aching    Treatment:  Exercises:  Exercises  Exercise 1: stand gastroc and soleous stretch off step 3 x 15\" ea  Exercise 2: ankle circles cw/ccw x 15  Exercise 3: seated ankle pumps x 15, ankle inversion/eversion x 15  Exercise 7: B SLR x 10 , B SL hip abd x 10 , B prone hip ext x 10  Exercise 10: ankle ABCs x 1  Exercise 13: quad sets 5 sec hold x 15, left  Exercise 14: heel slides 5 sec hold x 10, left  Exercise 15: L SAQ 13 x 3\"  Exercise 17: L hamstring curls with GTB 11 x 3\"  Exercise 18: L LAQ 13 x 3\"    Modalities:  Electric stimulation, unattended (CPT 67062) /  (Medicare)  Patient Position: Seated  E-stim location: Left, Knee  E-stim type: Interferential (IFC)  E-stim via: 4

## 2024-10-04 ENCOUNTER — HOSPITAL ENCOUNTER (OUTPATIENT)
Dept: PHYSICAL THERAPY | Age: 39
Setting detail: THERAPIES SERIES
Discharge: HOME | End: 2024-10-04
Payer: COMMERCIAL

## 2024-10-04 PROCEDURE — 97110 THERAPEUTIC EXERCISES: CPT

## 2024-10-04 PROCEDURE — G0283 ELEC STIM OTHER THAN WOUND: HCPCS

## 2024-10-04 ASSESSMENT — PAIN DESCRIPTION - LOCATION: LOCATION: ANKLE;KNEE

## 2024-10-04 ASSESSMENT — PAIN DESCRIPTION - DESCRIPTORS: DESCRIPTORS: ACHING;TIGHTNESS

## 2024-10-04 ASSESSMENT — PAIN DESCRIPTION - PAIN TYPE: TYPE: CHRONIC PAIN

## 2024-10-04 ASSESSMENT — PAIN DESCRIPTION - ORIENTATION: ORIENTATION: RIGHT;LEFT

## 2024-10-04 ASSESSMENT — PAIN SCALES - GENERAL: PAINLEVEL_OUTOF10: 4

## 2024-10-04 NOTE — PROGRESS NOTES
[address]   [place] [address]  Phone:[phone]      Physical TherapyTreatment Note        Date: 10/4/2024  Patient: Kylie Lema  : 1985   Confirmed: Yes  MRN: [id number]  Referring Provider: Bucky Mendez MD  Medical Diagnosis: Achilles tendinitis of right lower extremity [M76.61]   Treatment Diagnosis: right Achilles tendon pain, impaired right ankle ROM    Visit Information:  Insurance: Payor: [employer] [place] / Plan: [employer] [place] / Product Type: *No Product type* /   PT Visit Information  PT Insurance Information: [employer]  Total # of Visits Approved: 30  Total # of Visits to Date: 9  No Show: 0  Canceled Appointment: 0  Progress Note Counter: -    Subjective Information:  Subjective: Left knee is hurting more than ankle lately. 4/10 L knee and 2/10 R ankle.  HEP Compliance:  [x] Good [] Fair     [] Poor [] Reports not doing due to:    Pain Screening  Patient Currently in Pain: Yes  Pain Assessment: 0-10  Pain Level: 4  Pain Type: Chronic pain  Pain Location: Ankle, Knee  Pain Orientation: Right, Left  Pain Descriptors: Aching, Tightness    Treatment:  Exercises:  Exercises  Exercise 1: stand gastroc and soleous stretch on foam wedge 3 x 15\" ea  Exercise 2: ankle circles cw/ccw x 15  Exercise 3: seated ankle pumps x 15, ankle inversion/eversion x 15  Exercise 4: ankle ABCs x 1  Exercise 7: B SLR x 12 , B SL hip abd x 12 , B prone hip ext x 12  Exercise 9: bridges 10 x 3\"  Exercise 10: side stepping with RTB 2 x 10  Exercise 13: quad sets 5 sec hold x 15, left  Exercise 14: heel slides 5 sec hold x 15, left  Exercise 15: L SAQ 15 x 3\"  Exercise 17: L hamstring curls with GTB 15 x 3\"  Exercise 18: L LAQ 15 x 3\"      Modalities:  Electric stimulation, unattended (CPT 94041) /  (Medicare)  Patient Position: Seated  E-stim location: Left, Knee  E-stim type: Interferential (IFC)  E-stim via: 4 Electrode Pads  Post treatment skin assessment:

## 2024-10-08 ENCOUNTER — HOSPITAL ENCOUNTER (OUTPATIENT)
Dept: PHYSICAL THERAPY | Age: 39
Setting detail: THERAPIES SERIES
Discharge: HOME | End: 2024-10-08
Payer: COMMERCIAL

## 2024-10-08 PROCEDURE — 97110 THERAPEUTIC EXERCISES: CPT

## 2024-10-08 ASSESSMENT — PAIN DESCRIPTION - ORIENTATION: ORIENTATION: RIGHT;LEFT

## 2024-10-08 ASSESSMENT — PAIN SCALES - GENERAL: PAINLEVEL_OUTOF10: 3

## 2024-10-08 ASSESSMENT — PAIN DESCRIPTION - LOCATION: LOCATION: ANKLE;KNEE

## 2024-10-08 ASSESSMENT — PAIN DESCRIPTION - PAIN TYPE: TYPE: CHRONIC PAIN

## 2024-10-08 ASSESSMENT — PAIN DESCRIPTION - DESCRIPTORS: DESCRIPTORS: ACHING;TIGHTNESS

## 2024-10-08 NOTE — PROGRESS NOTES
[address]   [place] [id number]  Phone:[phone]      Physical TherapyTreatment Note        Date: 10/8/2024  Patient: Kylie Lema  : 1985   Confirmed: Yes  MRN: [id number]  Referring Provider: Bucky Mendez MD      Medical Diagnosis: Achilles tendinitis of right lower extremity [M76.61]      Treatment Diagnosis: right Achilles tendon pain, impaired right ankle ROM    Visit Information:  Insurance: Payor: [employer] [place] / Plan: [employer] [place] / Product Type: *No Product type* /   PT Visit Information  PT Insurance Information: [employer]  Total # of Visits Approved: 30  Total # of Visits to Date: 10  No Show: 0  Canceled Appointment: 0  Progress Note Counter: 10/8-    Subjective Information:  Subjective: Pt reported had a bad day yesterday with L knee pain. Today is 3/10 L knee and R ankle is 2/10. Most of pain is on the L lateral side of knee and sometimes in the front of the knee.  Will have a fire feeling in the L knee. Ankle is feeling stronger and able to complete heel raises now.  HEP Compliance:  [x] Good [] Fair [] Poor [] Reports not doing due to:    Pain Screening  Patient Currently in Pain: Yes  Pain Assessment: 0-10  Pain Level: 3  Pain Type: Chronic pain  Pain Location: Ankle, Knee  Pain Orientation: Right, Left  Pain Descriptors: Aching, Tightness    Treatment:  Exercises:  Exercises  Exercise 1: stand gastroc and soleous stretch on foam wedge 3 x 15\" ea  Exercise 2: ankle circles cw/ccw x 15  Exercise 3: seated ankle pumps x 15, ankle inversion/eversion x 15 HEP  Exercise 4: ankle ABCs x 1 HEP  Exercise 7: B SLR x 12 , B SL hip abd x 12 , B prone hip ext x 12  Exercise 9: bridges 10 x 3\"  Exercise 10: side stepping with RTB 2 x 10  Exercise 13: quad sets 5 sec hold x 15, left  Exercise 17: L hamstring curls with GTB 15 x 3\"  Exercise 18: L LAQ 15 x 3\"    *Indicates exercise, modality, or manual techniques to be initiated when

## 2024-10-11 ENCOUNTER — HOSPITAL ENCOUNTER (OUTPATIENT)
Dept: PHYSICAL THERAPY | Age: 39
Setting detail: THERAPIES SERIES
Discharge: HOME | End: 2024-10-11
Payer: COMMERCIAL

## 2024-10-11 PROCEDURE — 97110 THERAPEUTIC EXERCISES: CPT

## 2024-10-11 ASSESSMENT — PAIN DESCRIPTION - DESCRIPTORS: DESCRIPTORS: ACHING;TIGHTNESS

## 2024-10-11 ASSESSMENT — PAIN SCALES - GENERAL: PAINLEVEL_OUTOF10: 3

## 2024-10-11 ASSESSMENT — PAIN DESCRIPTION - LOCATION: LOCATION: ANKLE;KNEE

## 2024-10-11 ASSESSMENT — PAIN DESCRIPTION - PAIN TYPE: TYPE: CHRONIC PAIN

## 2024-10-11 ASSESSMENT — PAIN DESCRIPTION - ORIENTATION: ORIENTATION: RIGHT;LEFT

## 2024-10-11 NOTE — PROGRESS NOTES
[address]   [place] [id number]  Phone:[phone]      Physical TherapyTreatment Note        Date: 10/11/2024  Patient: Kylie Lema  : 1985   Confirmed: Yes  MRN: [id number]  Referring Provider: Bucky Mendez MD  Medical Diagnosis: Achilles tendinitis of right lower extremity [M76.61]   Unilateral primary osteoarthritis, left knee [M17.12]   Treatment Diagnosis: right Achilles tendon pain, impaired right ankle ROM    Visit Information:  Insurance: Payor: [employer] [place] / Plan: [employer] [place] / Product Type: *No Product type* /   PT Visit Information  PT Insurance Information: [employer]  Total # of Visits Approved: 30  Total # of Visits to Date:   No Show: 0  Canceled Appointment: 0  Progress Note Counter: -    Subjective Information:  Subjective: Pt reports R ankle 1/10 and L knee 2-3/10. The pain is isolated to the lateral side of the knee. F/u with MD next Tuesday and will hopefully order an MRI per patient. Pt has a lot of appointments next week so will hold off on PT and most likey resume the following week depending on what doctor says.  HEP Compliance:  [x] Good [] Fair      [] Poor [] Reports not doing due to:    Pain Screening  Patient Currently in Pain: Yes  Pain Assessment: 0-10  Pain Level: 3  Pain Type: Chronic pain  Pain Location: Ankle, Knee  Pain Orientation: Right, Left  Pain Descriptors: Aching, Tightness    Treatment:  Exercises:  Exercises  Exercise 1: stand gastroc and soleous stretch on foam wedge 3 x 15\" ea  Exercise 2: standing heel raises B x 15  Exercise 7: B SLR x 15 , B SL hip abd x 15 , B prone hip ext x 15  Exercise 9: bridges 15 x 3\"  Exercise 10: side stepping with RTB 2 x 10  Exercise 11: squats holding onto back of bike x 10  Exercise 13: quad sets 5 sec hold x 15, left  Exercise 17: L hamstring curls with GTB 15 x 3\"  Exercise 18: L LAQ 2# 15 x 3\"      *Indicates exercise, modality, or manual techniques to be

## 2024-10-21 ENCOUNTER — OFFICE VISIT (OUTPATIENT)
Dept: PRIMARY CARE CLINIC | Age: 39
End: 2024-10-21
Payer: COMMERCIAL

## 2024-10-21 VITALS
DIASTOLIC BLOOD PRESSURE: 78 MMHG | WEIGHT: 253.6 LBS | BODY MASS INDEX: 43.53 KG/M2 | HEART RATE: 74 BPM | SYSTOLIC BLOOD PRESSURE: 130 MMHG | OXYGEN SATURATION: 98 %

## 2024-10-21 DIAGNOSIS — R05.1 ACUTE COUGH: ICD-10-CM

## 2024-10-21 DIAGNOSIS — J02.9 SORE THROAT: ICD-10-CM

## 2024-10-21 DIAGNOSIS — R09.81 CONGESTION OF NASAL SINUS: ICD-10-CM

## 2024-10-21 DIAGNOSIS — U07.1 COVID-19: Primary | ICD-10-CM

## 2024-10-21 LAB
INFLUENZA A ANTIBODY: NOT DETECTED
INFLUENZA B ANTIBODY: NOT DETECTED
Lab: ABNORMAL
PERFORMING INSTRUMENT: ABNORMAL
QC PASS/FAIL: ABNORMAL
S PYO AG THROAT QL: NORMAL
SARS-COV-2, POC: DETECTED

## 2024-10-21 PROCEDURE — 87804 INFLUENZA ASSAY W/OPTIC: CPT | Performed by: STUDENT IN AN ORGANIZED HEALTH CARE EDUCATION/TRAINING PROGRAM

## 2024-10-21 PROCEDURE — 87426 SARSCOV CORONAVIRUS AG IA: CPT | Performed by: STUDENT IN AN ORGANIZED HEALTH CARE EDUCATION/TRAINING PROGRAM

## 2024-10-21 PROCEDURE — 87880 STREP A ASSAY W/OPTIC: CPT | Performed by: STUDENT IN AN ORGANIZED HEALTH CARE EDUCATION/TRAINING PROGRAM

## 2024-10-21 PROCEDURE — 99214 OFFICE O/P EST MOD 30 MIN: CPT | Performed by: STUDENT IN AN ORGANIZED HEALTH CARE EDUCATION/TRAINING PROGRAM

## 2024-10-21 RX ORDER — ALBUTEROL SULFATE 90 UG/1
2 INHALANT RESPIRATORY (INHALATION) EVERY 6 HOURS PRN
Qty: 18 G | Refills: 3 | Status: SHIPPED | OUTPATIENT
Start: 2024-10-21

## 2024-10-21 RX ORDER — FLUTICASONE PROPIONATE 50 MCG
1 SPRAY, SUSPENSION (ML) NASAL DAILY
Qty: 16 G | Refills: 0 | Status: CANCELLED | OUTPATIENT
Start: 2024-10-21

## 2024-10-21 NOTE — PATIENT INSTRUCTIONS
COVID-positive, I recommended OTC treatment and quarantine for 5 days.  Gave instructions to go to the ER if very short of breath or temperature above 104 °F for 24 hours not responding to Tylenol.  Recommended to wear a mask for 2 weeks.  Gave instructions for warm air humidifier mask if cough is persisting for 2 weeks.  The following below are what I recommend for your symptoms:  -Vitamin C 1000 mg daily for 3 to 5 days  -Tylenol or ibuprofen for aches and chills and fever  -Livier pot/saline nasal rinses/Flonase for nasal congestion, sinus pressure, nasal drainage  -Cepacol throat lozenges, Vicks vapocool, or Chloraseptic throat spray for throat pain  -Mucinex for just chest congestion, or Mucinex DM for chest congestion and cough

## 2024-10-21 NOTE — PROGRESS NOTES
10/21/2024        Kylie Lema 1985 is a 39 y.o. female who presents today with:  Chief Complaint   Patient presents with    Congestion     Saturday started with stuffy nose sore throat thought it was allergies.         HPI:   Patient presents today due to sickness for 3 days  Pertinent positives: Nasal congestion, sore throat  Pertinent negatives: Fever, aches, chills, nausea, vomiting, diarrhea, wheezing, shortness of breath  Treatments tried: None  Sick Contacts: Unknown    Assessment & Plan   1. COVID-19  -     nirmatrelvir/ritonavir 300/100 (PAXLOVID, 300/100,) 20 x 150 MG & 10 x 100MG TBPK; Take 3 tablets (two 150 mg nirmatrelvir and one 100 mg ritonavir tablets) by mouth every 12 hours for 5 days., Disp-30 tablet, R-0Normal  -     albuterol sulfate HFA (VENTOLIN HFA) 108 (90 Base) MCG/ACT inhaler; Inhale 2 puffs into the lungs every 6 hours as needed for Wheezing, Disp-18 g, R-3Normal  2. Sore throat  -     POCT rapid strep A  3. Congestion of nasal sinus  -     POCT COVID-19, Antigen  -     POCT Influenza A/B  4. Acute cough     Medications Discontinued During This Encounter   Medication Reason    nystatin (MYCOSTATIN) 209933 UNIT/GM cream LIST CLEANUP    fluticasone (FLONASE) 50 MCG/ACT nasal spray LIST CLEANUP    ondansetron (ZOFRAN-ODT) 4 MG disintegrating tablet LIST CLEANUP    naproxen (NAPROSYN) 500 MG tablet LIST CLEANUP    ketorolac (TORADOL) 10 MG tablet LIST CLEANUP    ketoconazole (NIZORAL) 2 % cream LIST CLEANUP     No follow-ups on file.    COVID positive  COVID-positive, I recommended OTC treatment and quarantine for 5 days.  Gave instructions to go to the ER if very short of breath or temperature above 104 °F for 24 hours not responding to Tylenol.  Recommended to wear a mask for 2 weeks.  Gave instructions for warm air humidifier mask if cough is persisting for 2 weeks.  The following below are what I recommend for your symptoms:  -Vitamin C 1000 mg daily for 3 to 5 days  -Tylenol

## 2024-10-22 ENCOUNTER — OFFICE VISIT (OUTPATIENT)
Dept: FAMILY MEDICINE CLINIC | Age: 39
End: 2024-10-22
Payer: COMMERCIAL

## 2024-10-22 VITALS — OXYGEN SATURATION: 99 % | BODY MASS INDEX: 42.68 KG/M2 | HEIGHT: 64 IN | HEART RATE: 75 BPM | WEIGHT: 250 LBS

## 2024-10-22 DIAGNOSIS — M76.61 ACHILLES TENDINITIS OF RIGHT LOWER EXTREMITY: ICD-10-CM

## 2024-10-22 DIAGNOSIS — S83.8X2D INJURY OF MENISCUS OF LEFT KNEE, SUBSEQUENT ENCOUNTER: Primary | ICD-10-CM

## 2024-10-22 PROCEDURE — 99214 OFFICE O/P EST MOD 30 MIN: CPT | Performed by: FAMILY MEDICINE

## 2024-10-22 RX ORDER — NAPROXEN 500 MG/1
500 TABLET ORAL 2 TIMES DAILY WITH MEALS
Qty: 60 TABLET | Refills: 0 | Status: SHIPPED | OUTPATIENT
Start: 2024-10-22

## 2024-10-22 ASSESSMENT — ENCOUNTER SYMPTOMS: BACK PAIN: 0

## 2024-10-22 NOTE — PROGRESS NOTES
[facility] SPECIALTY/PRIMARY CARE  [address]  [facility] [address]  Dept: [phone]  Dept Fax: [phone]  Loc: [phone]     10/22/2024    Visit type: Follow up    Reason for Visit: 1 Month Follow-Up (Pt states there is improvement since LOV, intermittent shooting pain in Lt knee and lateral Lt lower leg, difficulty of using steps. States scripts and PT sessions helps w/ pain. ) and Discuss Medications (Pt would like to get naproxen refill. )         Patient: Kylie Lema is a 39 y.o. female     HPI: 39-year-old female presents for 1 month follow-up pertaining to left knee injury and right Achilles tendinitis.  In the original injury the patient had a slip and fall down the stairs and she went to the emergency room and had x-rays of the left knee.  No acute fracture was seen.  Since then the patient has had multiple oral medications and physical therapy and has had mild improvement though continues to have shooting pain with ambulation, pain with rotation, pain with transitioning positions.    ASSESSMENT/PLAN   Injury of meniscus of left knee, subsequent encounter  -     naproxen (NAPROSYN) 500 MG tablet; Take 1 tablet by mouth 2 times daily (with meals), Disp-60 tablet, R-0Normal  -     MRI KNEE LEFT WO CONTRAST; Future  Achilles tendinitis of right lower extremity  -     naproxen (NAPROSYN) 500 MG tablet; Take 1 tablet by mouth 2 times daily (with meals), Disp-60 tablet, R-0Normal     -Tenderness to palpation of the medial joint line and lateral joint line of the left knee.  Cruciate and collateral ligament stressing is unremarkable.  Thessaly test positive, Lilliam's test positive.  Significant pain with ambulation, transitioning positions, rotating/twisting.  -Minimal tenderness to palpation of the mid substance Achilles tendon, no pain with resistance to dorsiflexion or plantarflexion at this time.  Villalba's test negative, no palpable

## 2024-10-30 ENCOUNTER — HOSPITAL ENCOUNTER (OUTPATIENT)
Dept: MRI IMAGING | Age: 39
Discharge: HOME | End: 2024-11-01
Attending: FAMILY MEDICINE
Payer: COMMERCIAL

## 2024-10-30 DIAGNOSIS — S83.8X2D INJURY OF MENISCUS OF LEFT KNEE, SUBSEQUENT ENCOUNTER: ICD-10-CM

## 2024-10-30 PROCEDURE — 73721 MRI JNT OF LWR EXTRE W/O DYE: CPT

## 2024-11-05 ENCOUNTER — OFFICE VISIT (OUTPATIENT)
Dept: FAMILY MEDICINE CLINIC | Age: 39
End: 2024-11-05
Payer: COMMERCIAL

## 2024-11-05 VITALS
OXYGEN SATURATION: 98 % | SYSTOLIC BLOOD PRESSURE: 122 MMHG | DIASTOLIC BLOOD PRESSURE: 78 MMHG | HEIGHT: 64 IN | HEART RATE: 79 BPM | BODY MASS INDEX: 43.36 KG/M2 | WEIGHT: 254 LBS

## 2024-11-05 DIAGNOSIS — M25.562 PAIN IN JOINT OF LEFT KNEE: Primary | ICD-10-CM

## 2024-11-05 PROCEDURE — 99213 OFFICE O/P EST LOW 20 MIN: CPT | Performed by: FAMILY MEDICINE

## 2024-11-05 PROCEDURE — G8417 CALC BMI ABV UP PARAM F/U: HCPCS | Performed by: FAMILY MEDICINE

## 2024-11-05 PROCEDURE — G8484 FLU IMMUNIZE NO ADMIN: HCPCS | Performed by: FAMILY MEDICINE

## 2024-11-05 PROCEDURE — G8427 DOCREV CUR MEDS BY ELIG CLIN: HCPCS | Performed by: FAMILY MEDICINE

## 2024-11-05 PROCEDURE — 1036F TOBACCO NON-USER: CPT | Performed by: FAMILY MEDICINE

## 2024-11-05 ASSESSMENT — ENCOUNTER SYMPTOMS: BACK PAIN: 0

## 2024-11-05 NOTE — PROGRESS NOTES
[facility] SPECIALTY/PRIMARY CARE  [address], [address]  [facility] [address]  Dept: [phone]  Dept Fax: [phone]  Loc: [phone]     11/5/2024    Visit type: Follow up    Reason for Visit: Follow-up (Discuss MRI result. Pt states minimal improvement on Lt knee, more stable and slowly getting better, PT helps a lot. )         Patient: Kylie Lema is a 39 y.o. female     HPI: 39-year-old female presents for follow-up visit in regards to left knee pain.  MRI was ordered due to joint line pain and no improvement with therapy or oral medications.  Patient states there is minimal improvement, physical therapy does help a great deal.      ASSESSMENT/PLAN   Pain in joint of left knee     -MRI was reviewed with the patient today, no cruciate or collateral ligament tears are seen.  No meniscus tears are seen.  Prepatellar bursitis is noted.  No effusion is seen.    -Overall improvement is noted from the patient, physical therapy is significantly helping.  Patient also continues to utilize naproxen previously prescribed which does help with pain.  Patient is currently using a hinged knee brace.  -Most significant improvement is secondary to physical therapy.  Patient would like to continue with physical therapy at this time as she still gets pain with prolonged ambulation, rotating, transitioning positions.  I agree that continued physical therapy will be beneficial for the patient and reduction in pain for daily activities.    -Discussed an injection, patient prefer to hold off at this time.  Follow-up as needed    No orders of the defined types were placed in this encounter.       Subjective      Review of Systems   Musculoskeletal:  Positive for arthralgias and myalgias. Negative for back pain, gait problem, joint swelling and neck pain.        Left knee pain   Skin:  Negative for wound.   Neurological:  Negative for dizziness, weakness and numbness.

## 2024-11-26 ENCOUNTER — HOSPITAL ENCOUNTER (OUTPATIENT)
Dept: PHYSICAL THERAPY | Age: 39
Setting detail: THERAPIES SERIES
Discharge: HOME | End: 2024-11-26
Payer: COMMERCIAL

## 2024-11-26 PROCEDURE — 97110 THERAPEUTIC EXERCISES: CPT

## 2024-11-26 ASSESSMENT — PAIN DESCRIPTION - ORIENTATION: ORIENTATION: RIGHT;LEFT

## 2024-11-26 ASSESSMENT — PAIN DESCRIPTION - PAIN TYPE: TYPE: CHRONIC PAIN

## 2024-11-26 ASSESSMENT — PAIN DESCRIPTION - LOCATION: LOCATION: ANKLE;KNEE

## 2024-11-26 ASSESSMENT — PAIN SCALES - GENERAL: PAINLEVEL_OUTOF10: 4

## 2024-11-26 ASSESSMENT — PAIN DESCRIPTION - DESCRIPTORS: DESCRIPTORS: ACHING;TIGHTNESS;SORE

## 2024-11-26 NOTE — PROGRESS NOTES
[address]   [place] [address]  Phone:[phone]      Physical TherapyTreatment Note        Date: 2024  Patient: Kylie Lema  : 1985   Confirmed: Yes  MRN: [id number]  Referring Provider: Bucky Mendez MD  Medical Diagnosis: Achilles tendinitis of right lower extremity [M76.61]  Unilateral primary osteoarthritis, left knee [M17.12]   Treatment Diagnosis: right Achilles tendon pain, impaired right ankle ROM    Visit Information:  Insurance: Payor: [employer] JASON / Plan: [employer] JASON / Product Type: *No Product type* /   PT Visit Information  PT Insurance Information: [employer]  Total # of Visits Approved: 12  Total # of Visits to Date: 12  No Show: 0  Canceled Appointment: 0  Progress Note Counter:  visits approved as of 24 (new insurance)    Subjective Information:  Subjective: Pt hasn't been to therapy in a while because of work, change of insurance, and was waiting on MRI. Pt states MRI shows swelling but no tears. She does her exercises when she remembers. L ankle is sore because she thinks she slept wrong.  HEP Compliance:  [x] Good [] Fair  [] Poor [] Reports not doing due to:    Pain Screening  Patient Currently in Pain: Yes  Pain Assessment: 0-10  Pain Level: 4 (4/10 R ankle, 2/10 L knee)  Pain Type: Chronic pain  Pain Location: Ankle, Knee  Pain Orientation: Right, Left  Pain Descriptors: Aching, Tightness, Sore    Treatment:  Exercises:  Exercises  Exercise 1: stand gastroc and soleous stretch on foam wedge 5 x 15\" ea  Exercise 2: standing heel raises B x 15  Exercise 7: B SLR x 15 , B SL hip abd x 15 , B prone hip ext x 15  Exercise 9: bridges 15 x 3\"  Exercise 12: objective measures taken  Exercise 17: L hamstring curls with GTB 15 x 3\"  Exercise 18: L LAQ 2# 15 x 3\"      *Indicates exercise, modality, or manual techniques to be initiated when appropriate    Objective Measures:   STG 1 Current Status:: Patient 
LEFS=48/80   In progress   Long Term Goal 4: Patient will increase left knee flexion to WFLs for improved functional tolerance. LTG 4 Current Status:: L knee flexion AROM 108 deg   In progress     Body Structures, Functions, Activity Limitations Requiring Skilled Therapeutic Intervention: Decreased ROM, Decreased functional mobility , Decreased strength, Decreased endurance, Decreased balance, Increased pain  Assessment: Resumed care post 6.5 week gap in care. PN completed today. Pt notes ability to squat with more ease and less discomfort to perform job duties. Able to put shoes and socks on with less difficulty. Some regression in B LE strength with MMT. Pt would benefit from continued skilled care to work towards goals per POC.  Therapy Prognosis: Good      PLAN: [x]   Frequency/Duration:  Plan Frequency: 2  Plan weeks: 4-6 weeks  Current Treatment Recommendations: Strengthening, ROM, Balance training, Functional mobility training, Gait training, Stair training, Neuromuscular re-education, Manual, Home exercise program, Safety education & training, Patient/Caregiver education & training, Equipment evaluation, education, & procurement, Modalities  Modalities: Heat/Cold, Ultrasound                         Patient Status:[x] Continue/ Initiate plan of Care     [] Discharge PT.  Recommend pt continue with HEP.      [] Additional visits requested, Please re-certify for additional visits:     [] Hold     Objective information provided by: Electronically signed by July North PTA on 11/26/24 at 9:09 AM EST        Signature: Electronically signed by Emeli Mazariegos PT on 11/29/2024 at 9:41 AM      If you have any questions or concerns, please don't hesitate to call.  Thank you for your referral.    I have reviewed this plan of care and certify a need for medically necessary rehabilitation services.    Physician Signature:__________________________________________________________  Date:  Please sign and return

## 2024-12-02 ENCOUNTER — HOSPITAL ENCOUNTER (OUTPATIENT)
Dept: PHYSICAL THERAPY | Age: 39
Setting detail: THERAPIES SERIES
Discharge: HOME | End: 2024-12-02
Payer: COMMERCIAL

## 2024-12-02 PROCEDURE — 97110 THERAPEUTIC EXERCISES: CPT

## 2024-12-02 NOTE — PROGRESS NOTES
[address]   [place] [address]  Phone:[phone]      Physical TherapyTreatment Note        Date: 2024  Patient: Kylie Lema  : 1985   Confirmed: Yes  MRN: [id number]  Referring Provider: Bucky Mendez MD  Medical Diagnosis: Achilles tendinitis of right lower extremity [M76.61]  Unilateral primary osteoarthritis, left knee [M17.12]   Treatment Diagnosis: right Achilles tendon pain, impaired right ankle ROM    Visit Information:  Insurance: Payor: [employer] JASON / Plan: [employer] JASON / Product Type: *No Product type* /   PT Visit Information  PT Insurance Information: [employer]  Total # of Visits Approved: 12  Total # of Visits to Date: 13  No Show: 0  Canceled Appointment: 0  Progress Note Counter: 2/ visits approved as of 24 (new insurance)    Subjective Information:  Subjective: Pt states that her right heel is tight and her left knee feels tired. Wondering when she can stop wearing the knee brace.  HEP Compliance:  [x] Good [] Fair [] Poor [] Reports not doing due to:    Pain Screening  Patient Currently in Pain: Denies    Treatment:  Exercises:  Exercises  Exercise 1: stand gastroc and soleous stretch on foam wedge 5 x 15\" ea  Exercise 2: standing heel raises on half foam roll B x 15 (unable to tolerate eccentric lowering)  Exercise 5: BAPs board, L3, R foot, standing, DF/PF x 10, Inv,Ev x 10  Exercise 7: B SLR x 15 , B SL hip abd x 15 , B prone hip ext x 15  Exercise 9: bridges 15 x 3\"  Exercise 10: side stepping with RTB 2 x 10, fwd/bwd monster walk with RTB  Exercise 17: L hamstring curls with GTB 15 x 3\"  Exercise 18: L LAQ 2# 15 x 3\"      *Indicates exercise, modality, or manual techniques to be initiated when appropriate      Assessment:   Body Structures, Functions, Activity Limitations Requiring Skilled Therapeutic Intervention: Decreased ROM, Decreased functional mobility , Decreased strength, Decreased endurance,

## 2024-12-04 ENCOUNTER — HOSPITAL ENCOUNTER (OUTPATIENT)
Dept: PHYSICAL THERAPY | Age: 39
Setting detail: THERAPIES SERIES
Discharge: HOME | End: 2024-12-04
Payer: COMMERCIAL

## 2024-12-04 PROCEDURE — 97110 THERAPEUTIC EXERCISES: CPT

## 2024-12-04 ASSESSMENT — PAIN DESCRIPTION - ORIENTATION: ORIENTATION: LEFT;RIGHT

## 2024-12-04 ASSESSMENT — PAIN SCALES - GENERAL: PAINLEVEL_OUTOF10: 4

## 2024-12-04 ASSESSMENT — PAIN DESCRIPTION - PAIN TYPE: TYPE: CHRONIC PAIN

## 2024-12-04 ASSESSMENT — PAIN DESCRIPTION - DESCRIPTORS: DESCRIPTORS: ACHING

## 2024-12-04 ASSESSMENT — PAIN DESCRIPTION - LOCATION: LOCATION: ANKLE;KNEE

## 2024-12-04 NOTE — PROGRESS NOTES
[address]   [place] [id number]  Phone:[phone]      Physical TherapyTreatment Note        Date: 2024  Patient: Kylie Lema  : 1985   Confirmed: Yes  MRN: [id number]  Referring Provider: Bucky Mendez MD      Medical Diagnosis: Achilles tendinitis of right lower extremity [M76.61]  Unilateral primary osteoarthritis, left knee [M17.12]      Treatment Diagnosis: right Achilles tendon pain, impaired right ankle ROM    Visit Information:  Insurance: Payor: [employer] JASON / Plan: [employer] JASON / Product Type: *No Product type* /   PT Visit Information  PT Insurance Information: [employer]  Total # of Visits Approved: 12  Total # of Visits to Date: 14  No Show: 0  Canceled Appointment: 0  Progress Note Counter: 3/12 visits approved as of 24 (new insurance)    Subjective Information:  Subjective: Pt reports has been going a little more without the brace more fatigue in the knee.  HEP Compliance:  [x] Good [] Fair [] Poor [] Reports not doing due to:    Pain Screening  Patient Currently in Pain: Yes  Pain Assessment: 0-10  Pain Level: 4 (4/10 L knee 1/10 r ankle)  Pain Type: Chronic pain  Pain Location: Ankle, Knee  Pain Orientation: Left, Right  Pain Descriptors: Aching    Treatment:  Exercises:  Exercises  Exercise 1: stand gastroc and soleous stretch on foam wedge 5 x 15\" ea  Exercise 2: standing heel raises on half foam roll B x 15 (unable to tolerate eccentric lowering)  Exercise 5: BAPs board, L3, R foot, standing, DF/PF x 10, Inv,Ev x 10  Exercise 7: B SLR x 15 , B SL hip abd x 15 , B prone hip ext x 15  Exercise 9: bridges 15 x 3\"  Exercise 10: side stepping with RTB 2 x 10, fwd/bwd monster walk with RTB  Exercise 17: L hamstring curls with GTB 15 x 3\"  Exercise 18: L LAQ 2# 15 x 3\"  *Indicates exercise, modality, or manual techniques to be initiated when appropriate      Assessment:   Body Structures, Functions, Activity Limitations

## 2024-12-10 ENCOUNTER — HOSPITAL ENCOUNTER (OUTPATIENT)
Dept: PHYSICAL THERAPY | Age: 39
Setting detail: THERAPIES SERIES
Discharge: HOME | End: 2024-12-10
Payer: COMMERCIAL

## 2024-12-10 PROCEDURE — 97110 THERAPEUTIC EXERCISES: CPT

## 2024-12-10 NOTE — PROGRESS NOTES
[address]   [place] [address]  Phone:[phone]      Physical TherapyTreatment Note        Date: 12/10/2024  Patient: Kylie Lema  : 1985   Confirmed: Yes  MRN: [id number]  Referring Provider: Bucky Mendez MD  Medical Diagnosis: Achilles tendinitis of right lower extremity [M76.61]  Unilateral primary osteoarthritis, left knee [M17.12]   Treatment Diagnosis: right Achilles tendon pain, impaired right ankle ROM    Visit Information:  Insurance: Payor: [employer] JASON / Plan: [employer] JASON / Product Type: *No Product type* /   PT Visit Information  PT Insurance Information: [employer]  Total # of Visits Approved: 12  Total # of Visits to Date: 15  No Show: 0  Canceled Appointment: 0  Progress Note Counter: 4/ visits approved as of 24 (new insurance)    Subjective Information:  Subjective: Pt states knee isn't hurting. Her ankle is tight but no pain. She called off work today because she has been dealing with dizziness, migraine, and vomiting. Slept for 14 hours and feels better.  HEP Compliance:  [x] Good [] Fair [] Poor [] Reports not doing due to:    Pain Screening  Patient Currently in Pain: Denies    Treatment:  Exercises:  Exercises  Exercise 2: standing heel raises on half foam roll B x 15 (unable to tolerate eccentric lowering)  Exercise 5: BAPs board, L3, R foot, standing, DF/PF x 10, Inv,Ev x 10  Exercise 7: B SLR x 15 , B SL hip abd x 15 , B prone hip ext x 15  Exercise 9: bridges 15 x 3\"  Exercise 10: side stepping with RTB 2 x 10, fwd/bwd monster walk with RTB  Exercise 17: L hamstring curls with GTB 15 x 3\"  Exercise 18: L LAQ 2.5# 15 x 3\"      *Indicates exercise, modality, or manual techniques to be initiated when appropriate      Assessment:   Body Structures, Functions, Activity Limitations Requiring Skilled Therapeutic Intervention: Decreased ROM, Decreased functional mobility , Decreased strength, Decreased endurance,

## 2024-12-12 ENCOUNTER — HOSPITAL ENCOUNTER (OUTPATIENT)
Dept: PHYSICAL THERAPY | Age: 39
Setting detail: THERAPIES SERIES
Discharge: HOME | End: 2024-12-12
Payer: COMMERCIAL

## 2024-12-12 PROCEDURE — 97110 THERAPEUTIC EXERCISES: CPT

## 2024-12-12 ASSESSMENT — PAIN DESCRIPTION - PAIN TYPE: TYPE: CHRONIC PAIN

## 2024-12-12 ASSESSMENT — PAIN DESCRIPTION - LOCATION: LOCATION: ANKLE;KNEE

## 2024-12-12 ASSESSMENT — PAIN DESCRIPTION - DESCRIPTORS: DESCRIPTORS: ACHING

## 2024-12-12 ASSESSMENT — PAIN SCALES - GENERAL: PAINLEVEL_OUTOF10: 2

## 2024-12-12 NOTE — PROGRESS NOTES
[address]   [place] [id number]  Phone:[phone]      Physical TherapyTreatment Note        Date: 2024  Patient: Kylie Lema  : 1985   Confirmed: Yes  MRN: [id number]  Referring Provider: Bucky Mendez MD      Medical Diagnosis: Achilles tendinitis of right lower extremity [M76.61]  Unilateral primary osteoarthritis, left knee [M17.12]      Treatment Diagnosis: right Achilles tendon pain, impaired right ankle ROM    Visit Information:  Insurance: Payor: [employer]ASON / Plan: [employer]ASON / Product Type: *No Product type* /   PT Visit Information  PT Insurance Information: [employer]  Total # of Visits Approved: 12  Total # of Visits to Date: 16  No Show: 0  Canceled Appointment: 0  Progress Note Counter:  visits approved as of 24 (new insurance)    Subjective Information:  Subjective: Pt reports that she was able to go 1/2 day without brace this day but was sitting at desk most of the day.  HEP Compliance:  [x] Good [] Fair [] Poor [] Reports not doing due to:    Pain Screening  Patient Currently in Pain: Yes  Pain Assessment: 0-10  Pain Level: 2 (1/10 knee 2/10 ankle)  Pain Type: Chronic pain  Pain Location: Ankle, Knee  Pain Descriptors: Aching    Treatment:  Exercises:  Exercises  Exercise 2: standing heel raises on half foam roll B x 15 (unable to tolerate eccentric lowering)  Exercise 5: BAPs board, L2, R foot, standing, DF/PF x 10, Inv,Ev x 10  Exercise 7: B SLR x 15 , B SL hip abd x 15 , B prone hip ext x 15  Exercise 9: bridges 15 x 3\"  Exercise 10: side stepping with RTB 2 x 10, fwd/bwd monster walk with RTB  Exercise 17: L hamstring curls with GTB 15 x 3\"  Exercise 18: L LAQ 2.5# 15 x 3\"      *Indicates exercise, modality, or manual techniques to be initiated when appropriate    Objective Measures:   LTG 2 Current Status:: R ankle DF 4/5, PF 4+/5, Inv 4/5, Ev 4/5.      Assessment:   Body Structures, Functions,

## 2024-12-17 ENCOUNTER — HOSPITAL ENCOUNTER (OUTPATIENT)
Dept: PHYSICAL THERAPY | Age: 39
Setting detail: THERAPIES SERIES
Discharge: HOME | End: 2024-12-17
Payer: COMMERCIAL

## 2024-12-17 PROCEDURE — 97110 THERAPEUTIC EXERCISES: CPT

## 2024-12-17 ASSESSMENT — PAIN DESCRIPTION - LOCATION: LOCATION: ANKLE;KNEE

## 2024-12-17 ASSESSMENT — PAIN DESCRIPTION - PAIN TYPE: TYPE: CHRONIC PAIN

## 2024-12-17 ASSESSMENT — PAIN DESCRIPTION - ORIENTATION: ORIENTATION: RIGHT;LEFT

## 2024-12-17 ASSESSMENT — PAIN SCALES - GENERAL: PAINLEVEL_OUTOF10: 2

## 2024-12-17 ASSESSMENT — PAIN DESCRIPTION - DESCRIPTORS: DESCRIPTORS: ACHING

## 2024-12-17 NOTE — PROGRESS NOTES
[address]   [place] [address]  Phone:[phone]      Physical TherapyTreatment Note        Date: 2024  Patient: Kylie Lema  : 1985   Confirmed: Yes  MRN: [id number]  Referring Provider: Bucky Mendez MD  Medical Diagnosis: Achilles tendinitis of right lower extremity [M76.61]  Unilateral primary osteoarthritis, left knee [M17.12]   Treatment Diagnosis: right Achilles tendon pain, impaired right ankle ROM    Visit Information:  Insurance: Payor: [employer] JASON / Plan: [employer] JASON / Product Type: *No Product type* /   PT Visit Information  PT Insurance Information: [employer]  Total # of Visits Approved: 12  Total # of Visits to Date:   No Show: 0  Canceled Appointment: 0  Progress Note Counter:  visits approved as of 24 (new insurance)    Subjective Information:  Subjective: Pt's ankle pain is sharp but low level today. She thinks it's from being on her feet all day. She hasn't been wearing her knee brace the past couple days. Her leg has been more \"tired\" without it but no pain.  HEP Compliance:  [] Good [x] Fair [] Poor [] Reports not doing due to:    Pain Screening  Patient Currently in Pain: Yes  Pain Assessment: 0-10  Pain Level: 2 (2/10 ankle, 0/10 knee)  Pain Type: Chronic pain  Pain Location: Ankle, Knee  Pain Orientation: Right, Left  Pain Descriptors: Aching    Treatment:  Exercises:  Exercises  Exercise 2: standing heel raises on half foam roll B x 17 (unable to tolerate eccentric lowering)  Exercise 5: BAPs board, L2, R foot, standing, DF/PF x 15, Inv,Ev x 15  Exercise 7: B SLR 2 x 10 , B SL hip abd 2 x 10 , B prone hip ext 2 x 10  Exercise 9: bridges with BTB 15 x 3\"  Exercise 10: side stepping with RTB 3 x 10, fwd/bwd monster walk with RTB 3 x 10  Exercise 17: L hamstring curls with GTB 2 x 10, 3\"  Exercise 18: L LAQ 2.5# 2 x 10, 3\"      *Indicates exercise, modality, or manual techniques to be initiated when

## 2024-12-19 ENCOUNTER — HOSPITAL ENCOUNTER (OUTPATIENT)
Dept: PHYSICAL THERAPY | Age: 39
Setting detail: THERAPIES SERIES
Discharge: HOME | End: 2024-12-19
Payer: COMMERCIAL

## 2024-12-19 PROCEDURE — 97110 THERAPEUTIC EXERCISES: CPT

## 2024-12-19 ASSESSMENT — PAIN DESCRIPTION - PAIN TYPE: TYPE: CHRONIC PAIN

## 2024-12-19 ASSESSMENT — PAIN DESCRIPTION - ORIENTATION: ORIENTATION: RIGHT

## 2024-12-19 ASSESSMENT — PAIN SCALES - GENERAL: PAINLEVEL_OUTOF10: 4

## 2024-12-19 ASSESSMENT — PAIN DESCRIPTION - LOCATION: LOCATION: ANKLE

## 2024-12-19 NOTE — PROGRESS NOTES
[address]     [place] [address]      Phone:[phone]      PHYSICAL THERAPY PLAN OF CARE     [] Certification  [] Recertification []  Plan of Care  [] Progress Note [x] Discharge      Referring Provider: Bucky Mendez MD    From:  Emeli Mazariegos, PT     Patient: Kylie Lema (39 y.o. female) : 1985 Date: 2024   Medical Diagnosis: Achilles tendinitis of right lower extremity [M76.61]  Unilateral primary osteoarthritis, left knee [M17.12]    Treatment Diagnosis: right Achilles tendon pain, impaired right ankle ROM    Progress Report Period from: 2024  to 2024    Visits to Date: 18 No Show: 0 Cancelled Appts: 0    OBJECTIVE:   Short Term Goals - Time Frame for Short Term Goals: 3 weeks    Goals Current/Discharge status  Status   Short Term Goal 1: Patient will report 25% improvement in pain in right Achilles and left knee with activity.  STG 1 Current Status:: Patient reports 25% improvement in pain in right achilles and left knee with activity   Met   Short Term Goal 2: Patient will be independent with HEP.  STG 2 Current Status:: Pt is independent with HEP   Met     Long Term Goals - Time Frame for Long Term Goals : 4-6 weeks  Goals Current/ Discharge status Status   Long Term Goal 1: Patient will increase right ankle ROM to WFLs for improved functional tolerance. LTG 1 Current Status:: AROM: R Ankle Dorsiflexion (0-20): 10 deg, R Ankle Plantar Flexion (0-45): 36 deg, R Ankle Inversion (0-40): 38 deg, R Ankle Eversion (0-20): 20 deg   Met   Long Term Goal 2: Patient will increase strength in right ankle and bilateral hips >/= 4+/5 for improved ambulation tolerance. LTG 2 Current Status:: R ankle 4+/5 in all directions. B hips 4+/5 in all directions   Met   Long Term Goal 3: LEFS >/= 54/80 to demonstrate functional improvements. LTG 3 Current Status:: LEFS=64/80   Met   Long Term Goal 4: Patient will increase left knee flexion to WFLs for improved

## 2024-12-19 NOTE — PROGRESS NOTES
[address]   [place] [id number]  Phone:[phone]      Physical TherapyTreatment Note        Date: 2024  Patient: Kylie Lema  : 1985   Confirmed: Yes  MRN: [id number]  Referring Provider: Bucky Mendez MD  Medical Diagnosis: Achilles tendinitis of right lower extremity [M76.61]  Unilateral primary osteoarthritis, left knee [M17.12]   Treatment Diagnosis: right Achilles tendon pain, impaired right ankle ROM    Visit Information:  Insurance: Payor: [employer]ASON / Plan: [employer] JASON / Product Type: *No Product type* /   PT Visit Information  PT Insurance Information: [employer]  Total # of Visits Approved: 12  Total # of Visits to Date: 18  No Show: 0  Canceled Appointment: 0  Progress Note Counter:  visits approved as of 24 (new insurance)    Subjective Information:  Subjective: Pt reports R ankle is giving her trouble today but her L knee is is feeling good.  HEP Compliance:  [x] Good [] Fair [] Poor [] Reports not doing due to:    Pain Screening  Patient Currently in Pain: Yes  Pain Assessment: 0-10  Pain Level: 4  Pain Type: Chronic pain  Pain Location: Ankle  Pain Orientation: Right  Pain Descriptors: Aching    Treatment:  Exercises:  Exercises  Exercise 7: B SLR 2 x 10 , B SL hip abd 2 x 10 , B prone hip ext 2 x 10  Exercise 9: bridges with BTB 15 x 3\"  Exercise 10: side stepping with RTB 3 x 10, fwd/bwd monster walk with RTB 3 x 10  Exercise 12: objective measures taken    *Indicates exercise, modality, or manual techniques to be initiated when appropriate    Objective Measures:   STG 1 Current Status:: Patient reports 25% improvement in pain in right achilles and left knee with activity  STG 2 Current Status:: Pt is independent with HEP    LTG 1 Current Status:: AROM: R Ankle Dorsiflexion (0-20): 10 deg, R Ankle Plantar Flexion (0-45): 36 deg, R Ankle Inversion (0-40): 38 deg, R Ankle Eversion (0-20): 20 deg  LTG 2

## 2025-01-30 ENCOUNTER — OFFICE VISIT (OUTPATIENT)
Dept: PRIMARY CARE CLINIC | Age: 40
End: 2025-01-30
Payer: COMMERCIAL

## 2025-01-30 VITALS
DIASTOLIC BLOOD PRESSURE: 78 MMHG | WEIGHT: 253 LBS | BODY MASS INDEX: 43.19 KG/M2 | OXYGEN SATURATION: 98 % | HEART RATE: 82 BPM | SYSTOLIC BLOOD PRESSURE: 122 MMHG | HEIGHT: 64 IN | TEMPERATURE: 98.1 F

## 2025-01-30 DIAGNOSIS — E66.813 CLASS 3 SEVERE OBESITY DUE TO EXCESS CALORIES WITHOUT SERIOUS COMORBIDITY WITH BODY MASS INDEX (BMI) OF 40.0 TO 44.9 IN ADULT: ICD-10-CM

## 2025-01-30 DIAGNOSIS — F33.41 MDD (MAJOR DEPRESSIVE DISORDER), RECURRENT, IN PARTIAL REMISSION (HCC): ICD-10-CM

## 2025-01-30 DIAGNOSIS — F41.1 GAD (GENERALIZED ANXIETY DISORDER): ICD-10-CM

## 2025-01-30 DIAGNOSIS — E66.01 CLASS 3 SEVERE OBESITY DUE TO EXCESS CALORIES WITHOUT SERIOUS COMORBIDITY WITH BODY MASS INDEX (BMI) OF 40.0 TO 44.9 IN ADULT: ICD-10-CM

## 2025-01-30 DIAGNOSIS — R29.898 WRIST WEAKNESS: Primary | ICD-10-CM

## 2025-01-30 DIAGNOSIS — L30.9 ECZEMA, UNSPECIFIED TYPE: ICD-10-CM

## 2025-01-30 PROCEDURE — 1036F TOBACCO NON-USER: CPT | Performed by: STUDENT IN AN ORGANIZED HEALTH CARE EDUCATION/TRAINING PROGRAM

## 2025-01-30 PROCEDURE — G8417 CALC BMI ABV UP PARAM F/U: HCPCS | Performed by: STUDENT IN AN ORGANIZED HEALTH CARE EDUCATION/TRAINING PROGRAM

## 2025-01-30 PROCEDURE — G8427 DOCREV CUR MEDS BY ELIG CLIN: HCPCS | Performed by: STUDENT IN AN ORGANIZED HEALTH CARE EDUCATION/TRAINING PROGRAM

## 2025-01-30 PROCEDURE — 99214 OFFICE O/P EST MOD 30 MIN: CPT | Performed by: STUDENT IN AN ORGANIZED HEALTH CARE EDUCATION/TRAINING PROGRAM

## 2025-01-30 RX ORDER — CETIRIZINE HYDROCHLORIDE 10 MG/1
10 TABLET ORAL NIGHTLY
Qty: 90 TABLET | Refills: 1 | Status: SHIPPED | OUTPATIENT
Start: 2025-01-30 | End: 2025-07-29

## 2025-01-30 SDOH — ECONOMIC STABILITY: INCOME INSECURITY: IN THE LAST 12 MONTHS, WAS THERE A TIME WHEN YOU WERE NOT ABLE TO PAY THE MORTGAGE OR RENT ON TIME?: YES

## 2025-01-30 SDOH — ECONOMIC STABILITY: FOOD INSECURITY: WITHIN THE PAST 12 MONTHS, THE FOOD YOU BOUGHT JUST DIDN'T LAST AND YOU DIDN'T HAVE MONEY TO GET MORE.: SOMETIMES TRUE

## 2025-01-30 SDOH — ECONOMIC STABILITY: FOOD INSECURITY: WITHIN THE PAST 12 MONTHS, YOU WORRIED THAT YOUR FOOD WOULD RUN OUT BEFORE YOU GOT MONEY TO BUY MORE.: NEVER TRUE

## 2025-01-30 SDOH — ECONOMIC STABILITY: TRANSPORTATION INSECURITY
IN THE PAST 12 MONTHS, HAS THE LACK OF TRANSPORTATION KEPT YOU FROM MEDICAL APPOINTMENTS OR FROM GETTING MEDICATIONS?: NO

## 2025-01-30 SDOH — ECONOMIC STABILITY: TRANSPORTATION INSECURITY
IN THE PAST 12 MONTHS, HAS LACK OF TRANSPORTATION KEPT YOU FROM MEETINGS, WORK, OR FROM GETTING THINGS NEEDED FOR DAILY LIVING?: NO

## 2025-01-30 ASSESSMENT — PATIENT HEALTH QUESTIONNAIRE - PHQ9
SUM OF ALL RESPONSES TO PHQ QUESTIONS 1-9: 2
SUM OF ALL RESPONSES TO PHQ9 QUESTIONS 1 & 2: 2
SUM OF ALL RESPONSES TO PHQ9 QUESTIONS 1 & 2: 2
SUM OF ALL RESPONSES TO PHQ QUESTIONS 1-9: 2
1. LITTLE INTEREST OR PLEASURE IN DOING THINGS: SEVERAL DAYS
SUM OF ALL RESPONSES TO PHQ QUESTIONS 1-9: 2
2. FEELING DOWN, DEPRESSED OR HOPELESS: SEVERAL DAYS
2. FEELING DOWN, DEPRESSED OR HOPELESS: SEVERAL DAYS
1. LITTLE INTEREST OR PLEASURE IN DOING THINGS: SEVERAL DAYS
SUM OF ALL RESPONSES TO PHQ QUESTIONS 1-9: 2

## 2025-01-30 NOTE — PROGRESS NOTES
1/30/2025        Kylie Lema 1985 is a 39 y.o. female who presents today with:  Chief Complaint   Patient presents with    Follow-up     Wants to know if needs referral wants to discuss weight loss options        HPI:   Eczema  She has eczematous lesions on the flexure of her left thumb, behind both heels, and on her forehead.  She states she has had these for years, on and off.  The last time she spoke to someone about them, she was told they may be stress related.  She has used no prescription meds but has used lotions.      Depression/Anxiety  She sees a therapist for depression/anxiety.  She used to take Lexapro but did not like the side effects so she stopped it.  She switched to Wellbutrin and states that it worked very well however she ran out and was unable to afford more refills.  She is doing well off the medication currently.    Obesity  She was referred to bariatrics    Right wrist weakness for 6-7 years, has been getting weaker over the past few months.     Assessment & Plan   1. Wrist weakness  -     MRI WRIST RIGHT WO CONTRAST; Future  2. Class 3 severe obesity due to excess calories without serious comorbidity with body mass index (BMI) of 40.0 to 44.9 in adult  -     Jeet Ku MD, Bariatric Surgery, [place]  3. MDD (major depressive disorder), recurrent, in partial remission (HCC)  4. KAPIL (generalized anxiety disorder)  5. Eczema, unspecified type  -     cetirizine (ZYRTEC) 10 MG tablet; Take 1 tablet by mouth nightly, Disp-90 tablet, R-1Normal     Medications Discontinued During This Encounter   Medication Reason    diclofenac sodium (VOLTAREN) 1 % GEL LIST CLEANUP    ammonium lactate (AMLACTIN) 12 % cream LIST CLEANUP    BENZOYL PEROXIDE 5 % external wash LIST CLEANUP    clindamycin (CLEOCIN T) 1 % lotion LIST CLEANUP    clobetasol (TEMOVATE) 0.05 % ointment LIST CLEANUP    cetirizine (ZYRTEC) 10 MG tablet REORDER     Return in about 6 months (around 7/30/2025) for RTN in

## 2025-01-30 NOTE — PATIENT INSTRUCTIONS
If you receive an email to fill out a survey about our care here today, I would greatly appreciate it if you could fill it out for me, thank you!     Referral placed.  They will call you to schedule.  If they do not call you in 1 week, you can give them a call.

## 2025-02-12 ENCOUNTER — TELEPHONE (OUTPATIENT)
Dept: BARIATRICS/WEIGHT MGMT | Age: 40
End: 2025-02-12

## 2025-02-12 NOTE — TELEPHONE ENCOUNTER
Jeet Bain M.D.   NPI # [id number]   Tax ID # [id number]     Insurance Verification for Weight Loss Surgery     Date: 02/12/2025 Time: 1518   Insurance Company: [employer]   Phone: [phone]  Insurance ID: [id number]  Representative that I spoke with: Danielle SARAH   Call Reference #: [id number]     Bariatric Benefit CPT Code: [address] [address] Dx: E66.01   Predetermination Required: Yes - [employer]aility   Phone: [phone] Fax: [phone]  Deductible: $1,640 OOP: $8,100   Coinsurance 25% after deductible is met  Office Visit Copay (Specialist): $50  Allowed Amount: N/A Lifetime Maximum: 1 in a lifetime     [facility]   NPI: [id number] Tax ID: [id number]     Is [facility] in Network: Yes  Does plan require surgery to be done at   [facility] No [facility] [place] [facility]: No  If yes, is it mandatory: No  [place] [facility] [employer] ([employer]): No  [facility] ([facility]): No  Criteria: Medical policy for criteria or access information on website   0 Consecutive Monthly Appointments       Patient advised of this verification via phone call

## 2025-03-13 ENCOUNTER — HOSPITAL ENCOUNTER (OUTPATIENT)
Dept: MRI IMAGING | Age: 40
Discharge: HOME | End: 2025-03-15
Payer: COMMERCIAL

## 2025-03-13 DIAGNOSIS — R29.898 WRIST WEAKNESS: ICD-10-CM

## 2025-03-13 PROCEDURE — 73221 MRI JOINT UPR EXTREM W/O DYE: CPT

## 2025-03-14 ENCOUNTER — RESULTS FOLLOW-UP (OUTPATIENT)
Dept: MRI IMAGING | Age: 40
End: 2025-03-14

## 2025-03-31 ENCOUNTER — OFFICE VISIT (OUTPATIENT)
Dept: PRIMARY CARE CLINIC | Age: 40
End: 2025-03-31
Payer: COMMERCIAL

## 2025-03-31 VITALS
SYSTOLIC BLOOD PRESSURE: 114 MMHG | WEIGHT: 259 LBS | DIASTOLIC BLOOD PRESSURE: 76 MMHG | BODY MASS INDEX: 44.22 KG/M2 | HEART RATE: 76 BPM | TEMPERATURE: 97.9 F | HEIGHT: 64 IN | OXYGEN SATURATION: 99 %

## 2025-03-31 DIAGNOSIS — J06.9 URI WITH COUGH AND CONGESTION: Primary | ICD-10-CM

## 2025-03-31 PROCEDURE — 99213 OFFICE O/P EST LOW 20 MIN: CPT | Performed by: STUDENT IN AN ORGANIZED HEALTH CARE EDUCATION/TRAINING PROGRAM

## 2025-03-31 PROCEDURE — 1036F TOBACCO NON-USER: CPT | Performed by: STUDENT IN AN ORGANIZED HEALTH CARE EDUCATION/TRAINING PROGRAM

## 2025-03-31 PROCEDURE — G8417 CALC BMI ABV UP PARAM F/U: HCPCS | Performed by: STUDENT IN AN ORGANIZED HEALTH CARE EDUCATION/TRAINING PROGRAM

## 2025-03-31 PROCEDURE — G8427 DOCREV CUR MEDS BY ELIG CLIN: HCPCS | Performed by: STUDENT IN AN ORGANIZED HEALTH CARE EDUCATION/TRAINING PROGRAM

## 2025-03-31 NOTE — PROGRESS NOTES
into the lungs every 6 hours as needed for Wheezing, Disp: 18 g, Rfl: 3    norethindrone (JENCYCLA) 0.35 MG tablet, Take 1 tablet by mouth daily, Disp: 28 tablet, Rfl: 11    ibuprofen (ADVIL;MOTRIN) 600 MG tablet, Take 1 tablet by mouth 3 times daily as needed for Pain, Disp: 90 tablet, Rfl: 0    montelukast (SINGULAIR) 10 MG tablet, take 1 tablet by mouth nightly at bedtime. Can add to cetirizine if needed, Disp: 30 tablet, Rfl: 5    ketoconazole (NIZORAL) 2 % shampoo, , Disp: , Rfl:     mometasone (ELOCON) 0.1 % cream, MIX A PEA SIZED AMOUNT WITH A PEA SIZED AMOUNT OF KETOCONAZOLE CREAM. APPLY A THIN AMOUNT TO AFFECTED AREAS ON FACE TWICE DAILY FOR 2 WEEKS. (Patient not taking: Reported on 3/31/2025), Disp: , Rfl:    Past Medical History:   Diagnosis Date    ADHD (attention deficit hyperactivity disorder)     I was young, maybe in 3rd or 4th grade    Anxiety     Depression     Headache      History reviewed. No pertinent surgical history.  Family History   Problem Relation Age of Onset    High Blood Pressure Maternal Grandmother     Arthritis Maternal Grandmother     Stroke Maternal Grandmother     Thyroid Disease Mother      Social History     Tobacco Use    Smoking status: Never     Passive exposure: Never    Smokeless tobacco: Never   Substance Use Topics    Alcohol use: Yes     Comment: 1 or 2 drinks occasionally, maybe 1 or 2 times every 6 month       Attestation    # PMH, Surgical Hx, Family Hx, Social Hx, allergies and current medications reviewed and updated the computerized patient record.  # Health Maintenance reviewed.  # 3+ Prior external notes from unique source reviewed  # Reviewed with the patient: current clinical status, activities and diet.  # Previous labs and imaging reviewed.   # Side effects, adverse effects of the medication prescribed today, as well as treatment plan/ rationale and result expectations have been discussed with the patient who expresses understanding and desires to

## 2025-04-15 ENCOUNTER — OFFICE VISIT (OUTPATIENT)
Age: 40
End: 2025-04-15
Payer: COMMERCIAL

## 2025-04-15 VITALS
HEIGHT: 63 IN | BODY MASS INDEX: 45.86 KG/M2 | HEART RATE: 80 BPM | WEIGHT: 258.8 LBS | DIASTOLIC BLOOD PRESSURE: 80 MMHG | OXYGEN SATURATION: 98 % | SYSTOLIC BLOOD PRESSURE: 120 MMHG

## 2025-04-15 DIAGNOSIS — E66.01 MORBID OBESITY: Primary | ICD-10-CM

## 2025-04-15 DIAGNOSIS — E66.01 MORBID OBESITY (HCC): ICD-10-CM

## 2025-04-15 LAB
ESTIMATED AVERAGE GLUCOSE: 94 MG/DL
HBA1C MFR BLD: 4.9 % (ref 4–6)
TSH REFLEX: 1.12 UIU/ML (ref 0.44–3.86)

## 2025-04-15 PROCEDURE — 99205 OFFICE O/P NEW HI 60 MIN: CPT | Performed by: SURGERY

## 2025-04-15 PROCEDURE — G8427 DOCREV CUR MEDS BY ELIG CLIN: HCPCS | Performed by: SURGERY

## 2025-04-15 PROCEDURE — 1036F TOBACCO NON-USER: CPT | Performed by: SURGERY

## 2025-04-15 PROCEDURE — G8417 CALC BMI ABV UP PARAM F/U: HCPCS | Performed by: SURGERY

## 2025-04-15 RX ORDER — PHENTERMINE HYDROCHLORIDE 15 MG/1
15 CAPSULE ORAL EVERY MORNING
Qty: 30 CAPSULE | Refills: 0 | Status: SHIPPED | OUTPATIENT
Start: 2025-04-15 | End: 2025-05-15

## 2025-04-15 NOTE — PATIENT INSTRUCTIONS
We discussed that phentermine may have unintended side effects. Its side effects can vary from person to person. The most common are dry mouth and trouble sleeping. Serious side effects, such as heart valve disease and high blood pressure in the lung have occurred.    Take 15 mg phentermine per day and follow up for weight in 1 month.    Make the following appointments:       Sleep Medicine referral for Sleep Apnea assessment and treatment.      If you have [employer] insurance and do not want to wait 6 months for surgery given that there is no scientific data to support such a delay, call [phone] to switch to a [employer] medicaid plan.

## 2025-04-15 NOTE — PROGRESS NOTES
Surgery Consultation    Patient Name:  :  Hospital Day:   Kylie Lema 1985 [unfilled]     Date of Service: 4/15/2025    Subjective:      History of Present Illness:    Kylie Lema  is a 39 y.o. female referred by Purnima Valdez MD   for morbid obesity.  Kylie Lema reports multiple episodes of weight loss and regain after multiple diet and exercise programs. There is not a current, regular exercise routine. There has not been an evaluation for sleep apnea.  There is no nicotine or any regular alcohol use.  Positive for significant acid reflux sx.    [employer] insurance does not cover the most effective short and long term therapy for obesity and diabetes: bariatric surgery.      Past Medical History:   Diagnosis Date    ADHD (attention deficit hyperactivity disorder)     I was young, maybe in 3rd or 4th grade    Anxiety     Depression     Headache     No past surgical history on file.     Family History   Problem Relation Age of Onset    High Blood Pressure Maternal Grandmother     Arthritis Maternal Grandmother     Stroke Maternal Grandmother     Thyroid Disease Mother     Social History     Tobacco Use    Smoking status: Never     Passive exposure: Never    Smokeless tobacco: Never   Vaping Use    Vaping status: Some Days   Substance Use Topics    Alcohol use: Yes     Comment: couple nights a week    Drug use: Yes     Types: Marijuana (Weed)     Comment: occasionally        Allergies: Zolmitriptan    Review of Systems  Review of Systems - History obtained from the patient  General ROS: negative for - fever, malaise, or weight loss  ENT ROS: negative for - headaches, nasal congestion, or sore throat  Hematological and Lymphatic ROS: No bruising or easy bleeding.  Respiratory ROS: no cough, shortness of breath, or wheezing  Cardiovascular ROS: no chest pain or dyspnea on exertion  Gastrointestinal ROS: Negative for abdominal pain, distention, hematochezia, melena,

## 2025-06-27 ENCOUNTER — OFFICE VISIT (OUTPATIENT)
Dept: PRIMARY CARE CLINIC | Age: 40
End: 2025-06-27
Payer: COMMERCIAL

## 2025-06-27 VITALS
DIASTOLIC BLOOD PRESSURE: 72 MMHG | BODY MASS INDEX: 44.5 KG/M2 | TEMPERATURE: 98 F | SYSTOLIC BLOOD PRESSURE: 128 MMHG | HEART RATE: 85 BPM | OXYGEN SATURATION: 98 % | WEIGHT: 254.3 LBS

## 2025-06-27 DIAGNOSIS — A08.4 VIRAL GASTROENTERITIS: Primary | ICD-10-CM

## 2025-06-27 DIAGNOSIS — E55.9 VITAMIN D DEFICIENCY: ICD-10-CM

## 2025-06-27 PROCEDURE — G8427 DOCREV CUR MEDS BY ELIG CLIN: HCPCS | Performed by: STUDENT IN AN ORGANIZED HEALTH CARE EDUCATION/TRAINING PROGRAM

## 2025-06-27 PROCEDURE — 1036F TOBACCO NON-USER: CPT | Performed by: STUDENT IN AN ORGANIZED HEALTH CARE EDUCATION/TRAINING PROGRAM

## 2025-06-27 PROCEDURE — G8417 CALC BMI ABV UP PARAM F/U: HCPCS | Performed by: STUDENT IN AN ORGANIZED HEALTH CARE EDUCATION/TRAINING PROGRAM

## 2025-06-27 PROCEDURE — 99214 OFFICE O/P EST MOD 30 MIN: CPT | Performed by: STUDENT IN AN ORGANIZED HEALTH CARE EDUCATION/TRAINING PROGRAM

## 2025-06-27 RX ORDER — ONDANSETRON 4 MG/1
4 TABLET, FILM COATED ORAL 3 TIMES DAILY PRN
Qty: 30 TABLET | Refills: 1 | Status: SHIPPED | OUTPATIENT
Start: 2025-06-27

## 2025-06-27 RX ORDER — LOPERAMIDE HYDROCHLORIDE 2 MG/1
2 CAPSULE ORAL 4 TIMES DAILY PRN
Qty: 40 CAPSULE | Refills: 1 | Status: SHIPPED | OUTPATIENT
Start: 2025-06-27

## 2025-06-27 NOTE — PATIENT INSTRUCTIONS
If you receive an email to fill out a survey about our care here today, I would greatly appreciate it if you could fill it out for me, thank you!

## 2025-06-27 NOTE — PROGRESS NOTES
Take 1 tablet by mouth 3 times daily as needed for Pain 90 tablet 0    mometasone (ELOCON) 0.1 % cream       montelukast (SINGULAIR) 10 MG tablet take 1 tablet by mouth nightly at bedtime. Can add to cetirizine if needed 30 tablet 5    albuterol sulfate HFA (VENTOLIN HFA) 108 (90 Base) MCG/ACT inhaler Inhale 2 puffs into the lungs every 6 hours as needed for Wheezing (Patient not taking: Reported on 6/27/2025) 18 g 3     No current facility-administered medications for this visit.       PMH, Surgical Hx, Family Hx, and Social Hx reviewed and updated.  Health Maintenance reviewed.    Vitals:    06/27/25 1105   BP: 128/72   BP Site: Left Upper Arm   Patient Position: Sitting   BP Cuff Size: Large Adult   Pulse: 85   Temp: 98 °F (36.7 °C)   TempSrc: Temporal   SpO2: 98%   Weight: 115.3 kg (254 lb 4.8 oz)     BP Readings from Last 3 Encounters:   06/27/25 128/72   04/15/25 120/80   03/31/25 114/76     Wt Readings from Last 3 Encounters:   06/27/25 115.3 kg (254 lb 4.8 oz)   04/15/25 117.4 kg (258 lb 12.8 oz)   03/31/25 117.5 kg (259 lb)       Lab Results   Component Value Date    LABA1C 4.9 04/15/2025    LABA1C 5.0 06/05/2023     Lab Results   Component Value Date    CREATININE 0.61 06/05/2023     Lab Results   Component Value Date    ALT 24 06/05/2023    AST 17 06/05/2023     Lab Results   Component Value Date    HDL 46 06/05/2023        Review of Systems   Physical Exam  Constitutional:       General: She is not in acute distress.     Appearance: Normal appearance. She is not toxic-appearing.   HENT:      Right Ear: External ear normal.      Left Ear: External ear normal.      Mouth/Throat:      Mouth: Mucous membranes are moist.   Eyes:      Extraocular Movements: Extraocular movements intact.      Pupils: Pupils are equal, round, and reactive to light.   Cardiovascular:      Rate and Rhythm: Normal rate and regular rhythm.      Pulses: Normal pulses.      Heart sounds: Normal heart sounds.   Pulmonary:      Effort:

## 2025-07-30 ASSESSMENT — ENCOUNTER SYMPTOMS
COUGH: 0
NAUSEA: 0
SORE THROAT: 0
VOICE CHANGE: 0
VOMITING: 0
RHINORRHEA: 0
TROUBLE SWALLOWING: 0
SHORTNESS OF BREATH: 0
DIARRHEA: 0
ABDOMINAL PAIN: 0
CONSTIPATION: 0

## 2025-07-30 NOTE — PROGRESS NOTES
tablet 5    albuterol sulfate HFA (VENTOLIN HFA) 108 (90 Base) MCG/ACT inhaler Inhale 2 puffs into the lungs every 6 hours as needed for Wheezing (Patient not taking: Reported on 4/15/2025) 18 g 3    ibuprofen (ADVIL;MOTRIN) 600 MG tablet Take 1 tablet by mouth 3 times daily as needed for Pain (Patient not taking: Reported on 7/31/2025) 90 tablet 0     No current facility-administered medications on file prior to visit.     Review of Systems:     Review of Systems   Constitutional:  Negative for activity change, appetite change, chills, diaphoresis, fatigue, fever and unexpected weight change.   HENT:  Negative for congestion, postnasal drip, rhinorrhea, sneezing, sore throat, trouble swallowing and voice change.    Respiratory:  Negative for cough and shortness of breath.    Cardiovascular:  Negative for chest pain.   Gastrointestinal:  Negative for abdominal pain, constipation, diarrhea, nausea and vomiting.   Genitourinary:  Positive for menstrual problem. Negative for difficulty urinating, dyspareunia, dysuria, frequency, genital sores, pelvic pain, vaginal bleeding, vaginal discharge and vaginal pain.   Musculoskeletal:  Negative for arthralgias and myalgias.   Neurological:  Negative for dizziness, syncope and headaches.   Hematological:  Negative for adenopathy.   Psychiatric/Behavioral:  Negative for agitation.      Physical Exam:     Vitals:  /64 (BP Site: Right Upper Arm, Patient Position: Sitting, BP Cuff Size: Large Adult)   Pulse 80   Ht 1.626 m (5' 4\")   Wt 117.7 kg (259 lb 6.4 oz)   LMP 07/20/2025 (Exact Date)   BMI 44.53 kg/m²     Prior Pap History:  6/15/2023: NILM, HPV Negative     Physical Exam  Constitutional:       General: She is not in acute distress.     Appearance: Normal appearance. She is not ill-appearing.   HENT:      Mouth/Throat:      Mouth: Mucous membranes are moist.   Eyes:      General: No scleral icterus.        Right eye: No discharge.         Left eye: No discharge.

## 2025-07-31 ENCOUNTER — TELEPHONE (OUTPATIENT)
Dept: PRIMARY CARE CLINIC | Age: 40
End: 2025-07-31

## 2025-07-31 ENCOUNTER — OFFICE VISIT (OUTPATIENT)
Dept: OBGYN CLINIC | Age: 40
End: 2025-07-31
Payer: COMMERCIAL

## 2025-07-31 VITALS
HEART RATE: 80 BPM | BODY MASS INDEX: 44.28 KG/M2 | SYSTOLIC BLOOD PRESSURE: 118 MMHG | DIASTOLIC BLOOD PRESSURE: 64 MMHG | WEIGHT: 259.4 LBS | HEIGHT: 64 IN

## 2025-07-31 DIAGNOSIS — Z01.419 WOMEN'S ANNUAL ROUTINE GYNECOLOGICAL EXAMINATION: Primary | ICD-10-CM

## 2025-07-31 PROCEDURE — 99395 PREV VISIT EST AGE 18-39: CPT | Performed by: MIDWIFE

## 2025-07-31 RX ORDER — ACETAMINOPHEN AND CODEINE PHOSPHATE 120; 12 MG/5ML; MG/5ML
1 SOLUTION ORAL DAILY
Qty: 28 TABLET | Refills: 11 | Status: SHIPPED | OUTPATIENT
Start: 2025-07-31

## 2025-07-31 NOTE — TELEPHONE ENCOUNTER
----- Message from Camron DIAZ sent at 7/31/2025 10:47 AM EDT -----  Regarding: [facility] Referral Request  [facility] Referral Request    Reason for referral request: Specialty Provider    Specialist/Lab/Test patient is requesting (if known): Dermatology      Specialist Phone Number (if applicable):    Additional Information   --------------------------------------------------------------------------------------------------------------------------    Relationship to Patient: Self     Call Back Information: OK to leave message on voicemail  Preferred Call Back Number: Phone  [phone]

## 2025-08-01 DIAGNOSIS — N63.0 NODULE OF SKIN OF BREAST: Primary | ICD-10-CM
